# Patient Record
Sex: FEMALE | Race: WHITE | NOT HISPANIC OR LATINO | URBAN - METROPOLITAN AREA
[De-identification: names, ages, dates, MRNs, and addresses within clinical notes are randomized per-mention and may not be internally consistent; named-entity substitution may affect disease eponyms.]

---

## 2023-09-08 ENCOUNTER — OFFICE VISIT (OUTPATIENT)
Dept: FAMILY MEDICINE CLINIC | Facility: CLINIC | Age: 28
End: 2023-09-08
Payer: COMMERCIAL

## 2023-09-08 VITALS
SYSTOLIC BLOOD PRESSURE: 116 MMHG | HEART RATE: 80 BPM | DIASTOLIC BLOOD PRESSURE: 68 MMHG | BODY MASS INDEX: 21.55 KG/M2 | WEIGHT: 121.6 LBS | HEIGHT: 63 IN | TEMPERATURE: 98.2 F | OXYGEN SATURATION: 99 %

## 2023-09-08 DIAGNOSIS — Z00.00 ANNUAL PHYSICAL EXAM: Primary | ICD-10-CM

## 2023-09-08 DIAGNOSIS — F41.9 ANXIETY: ICD-10-CM

## 2023-09-08 DIAGNOSIS — Z76.89 ENCOUNTER TO ESTABLISH CARE: ICD-10-CM

## 2023-09-08 PROBLEM — R87.810 ASCUS WITH POSITIVE HIGH RISK HPV CERVICAL: Status: ACTIVE | Noted: 2022-09-21

## 2023-09-08 PROBLEM — R87.610 ASCUS WITH POSITIVE HIGH RISK HPV CERVICAL: Status: ACTIVE | Noted: 2022-09-21

## 2023-09-08 PROBLEM — K21.9 GASTROESOPHAGEAL REFLUX DISEASE WITHOUT ESOPHAGITIS: Status: ACTIVE | Noted: 2023-09-08

## 2023-09-08 PROBLEM — Z82.49 FH: CAD (CORONARY ARTERY DISEASE): Status: ACTIVE | Noted: 2023-05-18

## 2023-09-08 PROCEDURE — 99385 PREV VISIT NEW AGE 18-39: CPT | Performed by: FAMILY MEDICINE

## 2023-09-08 RX ORDER — AZITHROMYCIN 250 MG/1
TABLET, FILM COATED ORAL
COMMUNITY
Start: 2023-09-07

## 2023-09-08 RX ORDER — NORGESTIMATE AND ETHINYL ESTRADIOL 7DAYSX3 28
1 KIT ORAL DAILY
COMMUNITY
Start: 2023-08-17

## 2023-09-08 RX ORDER — OMEPRAZOLE 20 MG/1
20 CAPSULE, DELAYED RELEASE ORAL AS NEEDED
COMMUNITY
Start: 2023-08-08

## 2023-09-08 RX ORDER — DEXTROMETHORPHAN HYDROBROMIDE AND PROMETHAZINE HYDROCHLORIDE 15; 6.25 MG/5ML; MG/5ML
5 SYRUP ORAL DAILY PRN
COMMUNITY
Start: 2023-09-07

## 2023-09-08 NOTE — LETTER
September 8, 2023     Patient: Sarah Clancy  YOB: 1995  Date of Visit: 9/8/2023      To Whom it May Concern:    Sarah Clancy is under my professional care. Tc Yip has a diagnosis of anxiety and needs to live with her emotional support dog, Daquan Jolly who is a white labrador retriever. This will provide therapeutic emotional support which will alleviate her anxiety. If you have questions, please do not hesitate to contact me.              Sincerely,          Jada Bang MD  License Number: [id number]  NPI: [id number]

## 2023-09-08 NOTE — PROGRESS NOTES
[address] PRIMARY CARE    NAME: Sarah Clancy  AGE: 33 y.o. SEX: female  : 1995     DATE: 2023     Assessment and Plan:     Problem List Items Addressed This Visit        Other    Anxiety   Other Visit Diagnoses     Annual physical exam    -  Primary    Encounter to establish care            - Satisfactory physical examination  - HIV and Hep C screening declined at this time  - Per chart patient did have an abnormal pap smear in  followed by a colposcopy which was negative. She did have a repeat pap smear ; results are still pending  - Emotional support letter provided; see chart for copy  - Follow up in one year for annual physical or as needed    Immunizations and preventive care screenings were discussed with patient today. Appropriate education was printed on patient's after visit summary. Return in about 1 year (around 2024) for Annual physical.     Chief Complaint:     Chief Complaint   Patient presents with   • Establish Care     Needs letter for emotional support animal      History of Present Illness:     Adult Annual Physical      Sarah Clancy is a very pleasant 33year old female with a past medical history of anxiety and GERD who presents today for an encounter to establish care and physical. Apart from a mild episode of bronchitis which is currently being treated with a course of Azithromycin she feels well. She does have anxiety which she manages without medication. She does have an emotional support dog which helps immensely and is requesting a letter an emotional support letter for her landlord. She endorses no other complaints at this time. 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 She is in a monogamous relationship with her boyfriend who is currently in the [employer] and stationed in [place]. He will be coming home for good in 2 weeks.  She does not smoke tobacco cigarettes or use illicit drugs. She rarely drinks alcohol. Diet and Physical Activity  · Diet/Nutrition: well balanced diet. · Exercise: moderate cardiovascular exercise. Depression Screening  PHQ-2/9 Depression Screening    Little interest or pleasure in doing things: 0 - not at all  Feeling down, depressed, or hopeless: 0 - not at all  PHQ-2 Score: 0  PHQ-2 Interpretation: Negative depression screen       General Health  · Sleep: sleeps well. · Hearing: Does not require use of hearing aids. · Vision: goes for regular eye exams and wears glasses. · Dental: regular dental visits. /GYN Health  · Contraceptive method: oral contraceptives. · History of STDs?: no.     Review of Systems:     Review of Systems   Constitutional: Negative. HENT: Negative. Eyes: Negative. Respiratory: Negative. Cardiovascular: Negative. Gastrointestinal: Negative. Genitourinary: Negative. Musculoskeletal: Negative. Skin: Negative. Neurological: Negative. Psychiatric/Behavioral: Negative.        Past Medical History:     Past Medical History:   Diagnosis Date   • GERD (gastroesophageal reflux disease)    • Urinary tract infection       Past Surgical History:     Past Surgical History:   Procedure Laterality Date   • TONSILLECTOMY AND ADENOIDECTOMY Bilateral       Social History:     Social History     Socioeconomic History   • Marital status: Single     Spouse name: None   • Number of children: None   • Years of education: None   • Highest education level: None   Occupational History   • None   Tobacco Use   • Smoking status: Never   • Smokeless tobacco: Never   Vaping Use   • Vaping Use: Never used   Substance and Sexual Activity   • Alcohol use: Not Currently     Alcohol/week: 1.0 standard drink of alcohol     Types: 1 Glasses of wine per week     Comment: 1 glass every 6 months or more I am not a drinker   • Drug use: Never   • Sexual activity: Yes     Partners: Male     Comment: Birth control pill   Other Topics Concern   • None   Social History Narrative   • None     Social Determinants of Health     Financial Resource Strain: Not on file   Food Insecurity: Not on file   Transportation Needs: Not on file   Physical Activity: Not on file   Stress: Not on file   Social Connections: Not on file   Intimate Partner Violence: Not on file   Housing Stability: Not on file      Family History:     Family History   Problem Relation Age of Onset   • Heart disease Father    • Heart disease Maternal Grandfather    • Prostate cancer Maternal Grandfather    • Stroke Maternal Grandmother    • Heart disease Maternal Grandmother    • Diabetes Maternal Grandmother    • Breast cancer Maternal Grandmother    • Cancer Maternal Grandmother    • Mental illness Maternal Aunt    • Heart disease Maternal Aunt    • Bipolar disorder Maternal Aunt    • Heart disease Maternal Uncle       Current Medications:     Current Outpatient Medications   Medication Sig Dispense Refill   • azithromycin (ZITHROMAX) 250 mg tablet Take 2 tablets by mouth on day one; then one tablet daily for 4 days. • omeprazole (PriLOSEC) 20 mg delayed release capsule Take 20 mg by mouth if needed     • promethazine-dextromethorphan (PHENERGAN-DM) 6.25-15 mg/5 mL oral syrup Take 5 mL by mouth daily as needed     • Tri-Sprintec 0.18/0.215/0.25 MG-35 MCG per tablet Take 1 tablet by mouth daily       No current facility-administered medications for this visit. Allergies: Allergies   Allergen Reactions   • Nsaids Anaphylaxis and Hives   • Bismuth Subsalicylate Hives     Pt states "I can take OTC meds for my stomach, but no prescriptions." Pt reports her reaction is hives, "my throat closes off."   • Ibuprofen Hives and GI Bleeding     Pt reports it makes her stomach bleed.       Physical Exam:     /68 (BP Location: Left arm, Patient Position: Sitting, Cuff Size: Adult)   Pulse 80   Temp 98.2 °F (36.8 °C) (Temporal)   Ht 5' 3.18" (1.605 m)   Wt 55.2 kg (121 lb 9.6 oz)   SpO2 99%   BMI 21.42 kg/m²     Physical Exam  Constitutional:       General: She is not in acute distress. Appearance: She is not ill-appearing. HENT:      Head: Normocephalic and atraumatic. Mouth/Throat:      Mouth: Mucous membranes are moist.   Eyes:      General:         Right eye: No discharge. Left eye: No discharge. Extraocular Movements: Extraocular movements intact. Pupils: Pupils are equal, round, and reactive to light. Cardiovascular:      Rate and Rhythm: Normal rate. Pulmonary:      Effort: Pulmonary effort is normal. No respiratory distress. Breath sounds: No wheezing. Abdominal:      General: Bowel sounds are normal. There is no distension. Palpations: Abdomen is soft. Tenderness: There is no abdominal tenderness. Musculoskeletal:      Right lower leg: No edema. Left lower leg: No edema. Neurological:      General: No focal deficit present. Mental Status: She is alert. Motor: No weakness. Coordination: Coordination normal.      Gait: Gait normal.      Deep Tendon Reflexes: Reflexes normal.   Psychiatric:         Mood and Affect: Mood normal.         Behavior: Behavior normal.        BMI Counseling: Body mass index is 21.42 kg/m².  The BMI is normal.    MD Mariano Urias

## 2024-01-07 ENCOUNTER — APPOINTMENT (EMERGENCY)
Dept: ULTRASOUND IMAGING | Facility: HOSPITAL | Age: 29
End: 2024-01-07
Payer: COMMERCIAL

## 2024-01-07 ENCOUNTER — APPOINTMENT (EMERGENCY)
Dept: CT IMAGING | Facility: HOSPITAL | Age: 29
End: 2024-01-07
Payer: COMMERCIAL

## 2024-01-07 ENCOUNTER — HOSPITAL ENCOUNTER (EMERGENCY)
Facility: HOSPITAL | Age: 29
Discharge: HOME | End: 2024-01-07
Attending: EMERGENCY MEDICINE | Admitting: EMERGENCY MEDICINE
Payer: COMMERCIAL

## 2024-01-07 VITALS
DIASTOLIC BLOOD PRESSURE: 57 MMHG | SYSTOLIC BLOOD PRESSURE: 115 MMHG | WEIGHT: 124.8 LBS | TEMPERATURE: 97.9 F | HEART RATE: 87 BPM | OXYGEN SATURATION: 98 % | RESPIRATION RATE: 18 BRPM | BODY MASS INDEX: 21.98 KG/M2

## 2024-01-07 DIAGNOSIS — N83.209 OVARIAN CYST: Primary | ICD-10-CM

## 2024-01-07 DIAGNOSIS — D48.19 LEIOMYOMATOSIS: ICD-10-CM

## 2024-01-07 LAB
ALBUMIN SERPL BCP-MCNC: 4.4 G/DL (ref 3.5–5)
ALP SERPL-CCNC: 45 U/L (ref 34–104)
ALT SERPL W P-5'-P-CCNC: 14 U/L (ref 7–52)
ANION GAP SERPL CALCULATED.3IONS-SCNC: 15 MMOL/L
AST SERPL W P-5'-P-CCNC: 14 U/L (ref 13–39)
BACTERIA UR QL AUTO: ABNORMAL /HPF
BASOPHILS # BLD AUTO: 0.05 THOUSANDS/ÂΜL (ref 0–0.1)
BASOPHILS NFR BLD AUTO: 1 % (ref 0–1)
BILIRUB SERPL-MCNC: 0.3 MG/DL (ref 0.2–1)
BILIRUB UR QL STRIP: NEGATIVE
BUN SERPL-MCNC: 12 MG/DL (ref 5–25)
CALCIUM SERPL-MCNC: 9 MG/DL (ref 8.4–10.2)
CHLORIDE SERPL-SCNC: 101 MMOL/L (ref 96–108)
CLARITY UR: CLEAR
CO2 SERPL-SCNC: 24 MMOL/L (ref 21–32)
COLOR UR: ABNORMAL
CREAT SERPL-MCNC: 0.63 MG/DL (ref 0.6–1.3)
EOSINOPHIL # BLD AUTO: 0.11 THOUSAND/ÂΜL (ref 0–0.61)
EOSINOPHIL NFR BLD AUTO: 1 % (ref 0–6)
ERYTHROCYTE [DISTWIDTH] IN BLOOD BY AUTOMATED COUNT: 12.6 % (ref 11.6–15.1)
EXT PREGNANCY TEST URINE: NEGATIVE
EXT. CONTROL: NORMAL
GFR SERPL CREATININE-BSD FRML MDRD: 121 ML/MIN/1.73SQ M
GLUCOSE SERPL-MCNC: 85 MG/DL (ref 65–140)
GLUCOSE UR STRIP-MCNC: NEGATIVE MG/DL
HCT VFR BLD AUTO: 41.7 % (ref 34.8–46.1)
HGB BLD-MCNC: 13.8 G/DL (ref 11.5–15.4)
HGB UR QL STRIP.AUTO: ABNORMAL
IMM GRANULOCYTES # BLD AUTO: 0.05 THOUSAND/UL (ref 0–0.2)
IMM GRANULOCYTES NFR BLD AUTO: 1 % (ref 0–2)
KETONES UR STRIP-MCNC: ABNORMAL MG/DL
LEUKOCYTE ESTERASE UR QL STRIP: NEGATIVE
LIPASE SERPL-CCNC: 32 U/L (ref 11–82)
LYMPHOCYTES # BLD AUTO: 2.46 THOUSANDS/ÂΜL (ref 0.6–4.47)
LYMPHOCYTES NFR BLD AUTO: 24 % (ref 14–44)
MCH RBC QN AUTO: 28.9 PG (ref 26.8–34.3)
MCHC RBC AUTO-ENTMCNC: 33.1 G/DL (ref 31.4–37.4)
MCV RBC AUTO: 87 FL (ref 82–98)
MONOCYTES # BLD AUTO: 0.55 THOUSAND/ÂΜL (ref 0.17–1.22)
MONOCYTES NFR BLD AUTO: 6 % (ref 4–12)
MUCOUS THREADS UR QL AUTO: ABNORMAL
NEUTROPHILS # BLD AUTO: 6.85 THOUSANDS/ÂΜL (ref 1.85–7.62)
NEUTS SEG NFR BLD AUTO: 67 % (ref 43–75)
NITRITE UR QL STRIP: NEGATIVE
NON-SQ EPI CELLS URNS QL MICRO: ABNORMAL /HPF
NRBC BLD AUTO-RTO: 0 /100 WBCS
PH UR STRIP.AUTO: 5.5 [PH]
PLATELET # BLD AUTO: 272 THOUSANDS/UL (ref 149–390)
PMV BLD AUTO: 11.1 FL (ref 8.9–12.7)
POTASSIUM SERPL-SCNC: 3.5 MMOL/L (ref 3.5–5.3)
PROT SERPL-MCNC: 7.4 G/DL (ref 6.4–8.4)
PROT UR STRIP-MCNC: NEGATIVE MG/DL
RBC # BLD AUTO: 4.78 MILLION/UL (ref 3.81–5.12)
RBC #/AREA URNS AUTO: ABNORMAL /HPF
SODIUM SERPL-SCNC: 140 MMOL/L (ref 135–147)
SP GR UR STRIP.AUTO: 1.02 (ref 1–1.03)
UROBILINOGEN UR STRIP-ACNC: <2 MG/DL
WBC # BLD AUTO: 10.07 THOUSAND/UL (ref 4.31–10.16)
WBC #/AREA URNS AUTO: ABNORMAL /HPF

## 2024-01-07 PROCEDURE — G1004 CDSM NDSC: HCPCS

## 2024-01-07 PROCEDURE — 99284 EMERGENCY DEPT VISIT MOD MDM: CPT | Performed by: EMERGENCY MEDICINE

## 2024-01-07 PROCEDURE — 76856 US EXAM PELVIC COMPLETE: CPT

## 2024-01-07 PROCEDURE — 99244 OFF/OP CNSLTJ NEW/EST MOD 40: CPT | Performed by: OBSTETRICS & GYNECOLOGY

## 2024-01-07 PROCEDURE — 83690 ASSAY OF LIPASE: CPT

## 2024-01-07 PROCEDURE — 85025 COMPLETE CBC W/AUTO DIFF WBC: CPT

## 2024-01-07 PROCEDURE — 80053 COMPREHEN METABOLIC PANEL: CPT

## 2024-01-07 PROCEDURE — 96374 THER/PROPH/DIAG INJ IV PUSH: CPT

## 2024-01-07 PROCEDURE — 81001 URINALYSIS AUTO W/SCOPE: CPT

## 2024-01-07 PROCEDURE — 36415 COLL VENOUS BLD VENIPUNCTURE: CPT

## 2024-01-07 PROCEDURE — 76830 TRANSVAGINAL US NON-OB: CPT

## 2024-01-07 PROCEDURE — 81025 URINE PREGNANCY TEST: CPT

## 2024-01-07 PROCEDURE — 99284 EMERGENCY DEPT VISIT MOD MDM: CPT

## 2024-01-07 PROCEDURE — 74177 CT ABD & PELVIS W/CONTRAST: CPT

## 2024-01-07 RX ORDER — ONDANSETRON 2 MG/ML
4 INJECTION INTRAMUSCULAR; INTRAVENOUS ONCE
Status: COMPLETED | OUTPATIENT
Start: 2024-01-07 | End: 2024-01-07

## 2024-01-07 RX ORDER — ACETAMINOPHEN 325 MG/1
975 TABLET ORAL ONCE
Status: COMPLETED | OUTPATIENT
Start: 2024-01-07 | End: 2024-01-07

## 2024-01-07 RX ADMIN — IOHEXOL 75 ML: 350 INJECTION, SOLUTION INTRAVENOUS at 17:16

## 2024-01-07 RX ADMIN — ACETAMINOPHEN 975 MG: 325 TABLET, FILM COATED ORAL at 16:24

## 2024-01-07 RX ADMIN — ONDANSETRON 4 MG: 2 INJECTION INTRAMUSCULAR; INTRAVENOUS at 16:24

## 2024-01-07 NOTE — Clinical Note
Date: 1/7/2024  Patient: Zuleima Mcdonough  Admitted: 1/7/2024  3:49 PM  Attending Provider: Panda Green MD    Zuleima Mcdonough or her authorized caregiver has made the decision for the patient to leave the emergency department against the advice o f her attending physician. She or her authorized caregiver has been informed and understands the inherent risks, including death, ovarian compromise, infection, prolonged hospital stay, emergency surgery.  She or her authorized caregiver has decided  to accept the responsibility for this decision. Zuleima Mcdonough and all necessary parties have been advised that she may return for further evaluation or treatment. Her condition at time of discharge was stable.  Zuleima Mcdonough had current vital signs  as follows:  /89   Pulse (!) 113   Temp 97.9 °F (36.6 °C) (Oral)   Resp 18   Wt 56.6 kg (124 lb 12.8 oz)   LMP 12/17/2023 (Approximate)

## 2024-01-07 NOTE — ED ATTENDING ATTESTATION
1/7/2024  I, Panda Green MD, saw and evaluated the patient. I have discussed the patient with the resident/non-physician practitioner and agree with the resident's/non-physician practitioner's findings, Plan of Care, and MDM as documented in the resident's/non-physician practitioner's note, except where noted. All available labs and Radiology studies were reviewed.  I was present for key portions of any procedure(s) performed by the resident/non-physician practitioner and I was immediately available to provide assistance.       At this point I agree with the current assessment done in the Emergency Department.  I have conducted an independent evaluation of this patient a history and physical is as follows:    27 y/o otherwise healthy female sent from urgent care for evaluation of RLQ pain beginning yesterday and worsening today. Associated loss of appetite and nausea without vomiting. LMP 3 weeks ago. No urinary symptoms. No fever, chills. No history of abdominal surgeries. Appears uncomfortable but not in acute distress. Has focal RLQ tenderness with guarding. Plan labs, CT, symptomatic treatment. Suspect acute appendicitis, less likely ovarian pathology, ureteral stone, viral illness.    ED Course  ED Course as of 01/07/24 2238   Sun Jan 07, 2024 1927 CT shows:    1.  4.8 cm right ovarian cyst without associated inflammation. This may be the cause of patient's symptoms, and assessment for clinical signs of ovarian torsion is advised.  2.  No appendicitis.     1927 Pelvic ultrasound ordered but patient refused due to potential cost.  Discussed the possibility of ovarian torsion, infection, permanent sterilization.  Will discuss with OB/GYN team as patient is still having pain.   2047 After evaluation by OB/GYN team, the patient is agreeable to pelvic ultrasound for ovarian torsion rule out.   2237 US shows:    Leiomyomatous uterus     No evidence of right ovarian torsion. Left ovary not visualized and  therefore not evaluated.     Right ovarian septated cyst measuring up to 4.8 cm.  O-RADS 2 = Almost certainly benign  Follow up in 8-12 weeks.     7904 Patient reevaluated by OB/GYN team.  Deemed stable for discharge as her pain has improved.  Encouraged to return to the emergency department with any worsening symptoms for urgent evaluation.         Critical Care Time  Procedures

## 2024-01-07 NOTE — ED PROVIDER NOTES
History  Chief Complaint   Patient presents with    Abdominal Pain     Pt comes to ED c/o RLQ pain starting last night. +nausea     28-year-old female no significant medical history presents today with 1 day of right lower quadrant pain.  Patient states it started as periumbilical and right lower quadrant pain and now has persisted as only RLQ pain.  Patient states significant associated nausea, no vomiting yet.  Exacerbated by movement.  Has tried Tylenol which is only limited improvement.  No history of of abdominal surgeries, did have a bowel movement this morning and has been passing gas today, no family history of IBD, denies urinary symptoms including dysuria, urgency, frequency.  No other symptoms noted including chest pain/shortness of breath, diarrhea/constipation, numbness tingling.        Prior to Admission Medications   Prescriptions Last Dose Informant Patient Reported? Taking?   Tri-Sprintec 0.18/0.215/0.25 MG-35 MCG per tablet  Self Yes No   Sig: Take 1 tablet by mouth daily   azithromycin (ZITHROMAX) 250 mg tablet  Self Yes No   Sig: Take 2 tablets by mouth on day one; then one tablet daily for 4 days.   omeprazole (PriLOSEC) 20 mg delayed release capsule  Self Yes No   Sig: Take 20 mg by mouth if needed   promethazine-dextromethorphan (PHENERGAN-DM) 6.25-15 mg/5 mL oral syrup  Self Yes No   Sig: Take 5 mL by mouth daily as needed      Facility-Administered Medications: None       Past Medical History:   Diagnosis Date    GERD (gastroesophageal reflux disease)     Urinary tract infection        Past Surgical History:   Procedure Laterality Date    TONSILLECTOMY AND ADENOIDECTOMY Bilateral        Family History   Problem Relation Age of Onset    Heart disease Father     Heart disease Maternal Grandfather     Prostate cancer Maternal Grandfather     Stroke Maternal Grandmother     Heart disease Maternal Grandmother     Diabetes Maternal Grandmother     Breast cancer Maternal Grandmother     Cancer  Maternal Grandmother     Mental illness Maternal Aunt     Heart disease Maternal Aunt     Bipolar disorder Maternal Aunt     Heart disease Maternal Uncle      I have reviewed and agree with the history as documented.    E-Cigarette/Vaping    E-Cigarette Use Never User      E-Cigarette/Vaping Substances     Social History     Tobacco Use    Smoking status: Never    Smokeless tobacco: Never   Vaping Use    Vaping status: Never Used   Substance Use Topics    Alcohol use: Not Currently     Alcohol/week: 1.0 standard drink of alcohol     Types: 1 Glasses of wine per week     Comment: 1 glass every 6 months or more I am not a drinker    Drug use: Never        Review of Systems   Constitutional:  Negative for chills and fever.   HENT:  Negative for congestion, rhinorrhea and sneezing.    Eyes:  Negative for pain and visual disturbance.   Respiratory:  Negative for cough and shortness of breath.    Cardiovascular:  Negative for chest pain and palpitations.   Gastrointestinal:  Positive for abdominal pain and nausea. Negative for constipation, diarrhea and vomiting.   Genitourinary:  Negative for dysuria and hematuria.   Musculoskeletal:  Negative for arthralgias and back pain.   Skin:  Negative for color change and rash.   Neurological:  Negative for syncope, weakness, numbness and headaches.   All other systems reviewed and are negative.      Physical Exam  ED Triage Vitals   Temperature Pulse Respirations Blood Pressure SpO2   01/07/24 1554 01/07/24 1551 01/07/24 1551 01/07/24 1551 01/07/24 1551   97.9 °F (36.6 °C) (!) 113 18 150/89 100 %      Temp Source Heart Rate Source Patient Position - Orthostatic VS BP Location FiO2 (%)   01/07/24 1554 01/07/24 1551 01/07/24 2210 01/07/24 2210 --   Oral Monitor Lying Right arm       Pain Score       01/07/24 1551       6             Orthostatic Vital Signs  Vitals:    01/07/24 1551 01/07/24 2210   BP: 150/89 115/57   Pulse: (!) 113 87   Patient Position - Orthostatic VS:  Lying        Physical Exam  Vitals and nursing note reviewed.   Constitutional:       General: She is not in acute distress.     Appearance: She is not ill-appearing.   HENT:      Head: Normocephalic and atraumatic.      Right Ear: External ear normal.      Left Ear: External ear normal.      Nose: Nose normal. No congestion or rhinorrhea.      Mouth/Throat:      Mouth: Mucous membranes are moist.      Pharynx: Oropharynx is clear.   Eyes:      General: No scleral icterus.     Extraocular Movements: Extraocular movements intact.   Cardiovascular:      Rate and Rhythm: Regular rhythm. Tachycardia present.      Pulses: Normal pulses.      Heart sounds: Normal heart sounds.   Pulmonary:      Effort: Pulmonary effort is normal.      Breath sounds: Normal breath sounds.   Abdominal:      Palpations: Abdomen is soft.      Tenderness: There is abdominal tenderness in the right lower quadrant. There is guarding. Positive signs include McBurney's sign. Negative signs include Lemus's sign.   Musculoskeletal:         General: Normal range of motion.      Cervical back: Normal range of motion.   Skin:     General: Skin is warm and dry.   Neurological:      General: No focal deficit present.      Mental Status: She is alert and oriented to person, place, and time.   Psychiatric:         Mood and Affect: Mood normal.         Behavior: Behavior normal.         ED Medications  Medications   acetaminophen (TYLENOL) tablet 975 mg (975 mg Oral Given 1/7/24 1624)   ondansetron (ZOFRAN) injection 4 mg (4 mg Intravenous Given 1/7/24 1624)   iohexol (OMNIPAQUE) 350 MG/ML injection (MULTI-DOSE) 75 mL (75 mL Intravenous Given 1/7/24 1716)       Diagnostic Studies  Results Reviewed       Procedure Component Value Units Date/Time    Urine Microscopic [[id number]]  (Abnormal) Collected: 01/07/24 1633    Lab Status: Final result Specimen: Urine, Clean Catch Updated: 01/07/24 1731     RBC, UA 2-4 /hpf      WBC, UA 1-2 /hpf      Epithelial Cells  Occasional /hpf      Bacteria, UA Moderate /hpf      MUCUS THREADS Moderate    Comprehensive metabolic panel [[id number]] Collected: 01/07/24 1628    Lab Status: Final result Specimen: Blood from Arm, Left Updated: 01/07/24 1706     Sodium 140 mmol/L      Potassium 3.5 mmol/L      Chloride 101 mmol/L      CO2 24 mmol/L      ANION GAP 15 mmol/L      BUN 12 mg/dL      Creatinine 0.63 mg/dL      Glucose 85 mg/dL      Calcium 9.0 mg/dL      AST 14 U/L      ALT 14 U/L      Alkaline Phosphatase 45 U/L      Total Protein 7.4 g/dL      Albumin 4.4 g/dL      Total Bilirubin 0.30 mg/dL      eGFR 121 ml/min/1.73sq m     Narrative:      National Kid[employer] Foundation guidelines for Chronic Kidney Disease (CKD):     Stage 1 with normal or high GFR (GFR > 90 mL/min/1.73 square meters)    Stage 2 Mild CKD (GFR = 60-89 mL/min/1.73 square meters)    Stage 3A Moderate CKD (GFR = 45-59 mL/min/1.73 square meters)    Stage 3B Moderate CKD (GFR = 30-44 mL/min/1.73 square meters)    Stage 4 Severe CKD (GFR = 15-29 mL/min/1.73 square meters)    Stage 5 End Stage CKD (GFR <15 mL/min/1.73 square meters)  Note: GFR calculation is accurate only with a steady state creatinine    Lipase [[id number]]  (Normal) Collected: 01/07/24 1628    Lab Status: Final result Specimen: Blood from Arm, Left Updated: 01/07/24 1706     Lipase 32 u/L     UA w Reflex to Microscopic w Reflex to Culture [[id number]]  (Abnormal) Collected: 01/07/24 1633    Lab Status: Final result Specimen: Urine, Clean Catch Updated: 01/07/24 1654     Color, UA Light Yellow     Clarity, UA Clear     Specific Gravity, UA 1.024     pH, UA 5.5     Leukocytes, UA Negative     Nitrite, UA Negative     Protein, UA Negative mg/dl      Glucose, UA Negative mg/dl      Ketones, UA 20 (1+) mg/dl      Urobilinogen, UA <2.0 mg/dl      Bilirubin, UA Negative     Occult Blood, UA Trace    CBC and differential [[id number]] Collected: 01/07/24 1628    Lab Status: Final result Specimen: Blood from  Arm, Left Updated: 01/07/24 1645     WBC 10.07 Thousand/uL      RBC 4.78 Million/uL      Hemoglobin 13.8 g/dL      Hematocrit 41.7 %      MCV 87 fL      MCH 28.9 pg      MCHC 33.1 g/dL      RDW 12.6 %      MPV 11.1 fL      Platelets 272 Thousands/uL      nRBC 0 /100 WBCs      Neutrophils Relative 67 %      Immat GRANS % 1 %      Lymphocytes Relative 24 %      Monocytes Relative 6 %      Eosinophils Relative 1 %      Basophils Relative 1 %      Neutrophils Absolute 6.85 Thousands/µL      Immature Grans Absolute 0.05 Thousand/uL      Lymphocytes Absolute 2.46 Thousands/µL      Monocytes Absolute 0.55 Thousand/µL      Eosinophils Absolute 0.11 Thousand/µL      Basophils Absolute 0.05 Thousands/µL     POCT pregnancy, urine [[id number]]  (Normal) Resulted: 01/07/24 1637    Lab Status: Final result Specimen: Urine Updated: 01/07/24 1637     EXT Preg Test, Ur Negative     Control Valid                   US pelvis complete w transvaginal   Final Result by Farrukh Mann DO (01/07 2210)      Leiomyomatous uterus      No evidence of right ovarian torsion. Left ovary not visualized and therefore not evaluated.      Right ovarian septated cyst measuring up to 4.8 cm.  O-RADS 2 = Almost certainly benign  Follow up in 8-12 weeks.      The study was marked in EPIC for immediate notification.                  Workstation performed: [id number]         CT abdomen pelvis with contrast   Final Result by Rolando Mejia MD (01/07 1837)      1.  4.8 cm right ovarian cyst without associated inflammation. This may be the cause of patient's symptoms, and assessment for clinical signs of ovarian torsion is advised.   2.  No appendicitis.      The study was marked in EPIC for immediate notification.      Workstation performed: [id number]               Procedures  Procedures      ED Course  ED Course as of 01/07/24 2239   Sun Jan 07, 2024   1720 CBC, CMP, UA were all within normal limits with no signs of endorgan damage or infection.   1720  PREGNANCY TEST URINE: Negative   1946 Discussed with patient that we would like to do a pelvic ultrasound due to ovarian cyst found on CT and no signs of appendicitis to rule out ovarian torsion.  Patient refused at this time due to cost and time constraints.  Discussed with OB/GYN team who reiterated the importance of ultrasound to determine if she needs surgical intervention.  Discussed with patient again and she says she needs time to think about it and does need to be at work tomorrow so time is becoming an issue.   2039 OB/GYN evaluated the patient and are highly suspicious for ovarian torsion.  They were able to convince patient to stay and get the ultrasound.  Ultrasound ordered.                                       Medical Decision Making  Zuleima came to the emergency right lower quadrant abdominal pain.  Patient states it started periumbilical and then migrated down.  Also associate with nausea, no other symptoms.  Patient was significantly tender in the right lower quadrant with a positive McBurney's point on exam.  CT abdomen showed no appendicitis however did note a large ovarian cyst which could be leading to ovarian torsion given the patient the symptoms.  OB/GYN was consulted, and evaluated the patient and were highly suspicious for ovarian torsion as well.  Patient was ultimately ordered an ultrasound of the pelvis, which showed no signs of ovarian torsion.  It did redemonstrate the ovarian cyst and also noted leiomyomatosis in the uterus.  OB/GYN reevaluated the patient as well after ultrasound, and was comfortable discharging with very close OB/GYN follow-up and extremely strict return cautions.  Patient was informed of these findings, and instructed to follow-up closely with her OB/GYN for further evaluation and treatment.  She is also given very strict return precautions if the symptoms are not resolving or worsening.  Patient understood and agreed to plan.  Patient discharge stable  condition.    Amount and/or Complexity of Data Reviewed  Labs: ordered. Decision-making details documented in ED Course.  Radiology: ordered.    Risk  OTC drugs.  Prescription drug management.          Disposition  Final diagnoses:   Ovarian cyst   Leiomyomatosis     Time reflects when diagnosis was documented in both MDM as applicable and the Disposition within this note       Time User Action Codes Description Comment    1/7/2024  8:14 PM Erick Briceno Add [N83.209] Ovarian cyst     1/7/2024  8:15 PM Erick Briceno Add [R10.31] Right lower quadrant abdominal pain     1/7/2024 10:26 PM Erick Briceno Add [D48.19] Leiomyomatosis     1/7/2024 10:27 PM Erick Briceno Remove [R10.31] Right lower quadrant abdominal pain           ED Disposition       ED Disposition   Discharge    Condition   Stable    Date/Time   Sun Jan 7, 2024 10:26 PM    Comment   Zuleima Mcdonough discharge to home/self care.                   Follow-up Information       Follow up With Specialties Details Why Contact Info Additional Information    Brenda Pelletier MD Family Medicine Call  As needed, For ED follow-up [address] [place] [address]  [phone]       [facility] Emergency Department Emergency Medicine Go to  If symptoms worsen or do not resolve [facility] [address]  [phone] [facility] Emergency Department, [facility] [place], [place], [address]    [facility] Obstetrics and Gynecology Call  As needed [address] [address] [phone] [facility], [address], [address]    [phone]            Patient's Medications   Discharge Prescriptions    No medications on file     No discharge procedures on file.    PDMP Review       None             ED Provider  Attending physically available and evaluated Zuleima Mcdonough. I  managed the patient along with the ED Attending.    Electronically Signed by           Erick Briceno,   01/07/24 7853

## 2024-01-08 PROBLEM — R10.31 ABDOMINAL PAIN, RLQ (RIGHT LOWER QUADRANT): Status: ACTIVE | Noted: 2024-01-08

## 2024-01-08 NOTE — ASSESSMENT & PLAN NOTE
Significantly tender RLQ on abdominal exam with associated rebound and guarding  CT without evidence of appendicitis but newly identified 4.8 x 3.9 x 4.1 cm right ovarian cyst with septation  Transvaginal ultrasound without evidence of ovarian torsion, left ovary not visualized  Reviewed risks of ovarian torsion and waxing and waning presentation similar to patient's current presentation  Patient does not desire surgical management at this time  Reviewed strict return precautions for intractable abdominal pain, nausea, and vomiting with risk of ovarian torsion  Stable for discharge

## 2024-01-08 NOTE — CONSULTS
CONSULTATION - OB/GYN  Zuleima Mcdonough 28 y.o. female MRN: [id number]  Unit/Bed#: ED-38 Encounter: [id number]        ASSESSMENT/PLAN:  27yo G0 presenting with worsening RLQ abdominal pain and newly identified right ovarian cyst. Plan by problem:    * Abdominal pain, RLQ (right lower quadrant)  Assessment & Plan  Significantly tender RLQ on abdominal exam with associated rebound and guarding  CT without evidence of appendicitis but newly identified 4.8 x 3.9 x 4.1 cm right ovarian cyst with septation  Transvaginal ultrasound without evidence of ovarian torsion, left ovary not visualized  Reviewed risks of ovarian torsion and waxing and waning presentation similar to patient's current presentation  Patient does not desire surgical management at this time  Reviewed strict return precautions for intractable abdominal pain, nausea, and vomiting with risk of ovarian torsion  Stable for discharge          US pelvis complete w transvaginal 1/7/24  FINDINGS:   UTERUS:  The uterus is anteverted in position, measuring 8.5 x 4.5 x 6.4 cm.  4.0 x 3.4 x 3.5 cm intramural fibroid at the fundus. 1.3 x 1.0 x 1.5 cm subserosal fibroid posterior body  The cervix appears within normal limits.   ENDOMETRIUM:  The endometrial echo complex has an AP caliber of 6.0 mm.  Its appearance is within normal limits for age and cycle and shows no filling defects.   OVARIES/ADNEXA:  Right ovary:  5.1 x 4.9 x 5.1 cm. 68.0 mL.  Left ovary not visualized and therefore not evaluated. This may be due to overlying bowel gas.  Ovarian Doppler flow is within normal limits.  2 separate cysts or a single cyst with a smooth septation measuring up to 4.8 cm in conglomerate.   OTHER:  Small amount of simple free fluid in the pelvis, likely physiologic in this premenopausal female.      IMPRESSION:   Leiomyomatous uterus   No evidence of right ovarian torsion. Left ovary not visualized and therefore not evaluated.   Right ovarian septated cyst measuring up to  4.8 cm.  O-RADS 2 = Almost certainly benign  Follow up in 8-12 weeks.      SUBJECTIVE:    HPI: Zuleima Mcdonough is a 28 y.o. female who presents with worsening RLQ pain. LMP approximately 3 weeks ago. Urine pregnancy test negative on presentation. Zuleima reports worsening RLQ pain throughout the day that has steadily worsened with associated nausea. She reports the pain is sharp in nature like she is being pricked in the side but spontaneously resolves with rest. Ambulation and moving from a laying position make the pain worse. She has tried Tylenol with noted improvement in her pain. We discussed her newly diagnosed right ovarian cyst and risk of ovarian torsion. We reviewed the waxing and waning nature of ovarian torsion and although current ultrasound imaging does not revolve an actively torsed ovary with compromised vascular flow, there is a chance for torsion or re-torsion should her resolved pain be due to an intermittent torsion along the vascular pedicle. Although initially reluctant to undergo pelvic imaging and workup, Zuleima does express understanding of her differential diagnosis and risks of requiring emergent surgery or ovarian loss should torsion occur. Patient plans to follow-up accordingly and strict return precautions were reviewed prior to discharge.      Review of Systems   Constitutional:  Negative for chills and fever.   HENT:  Negative for congestion, sinus pressure and sinus pain.    Eyes:  Negative for visual disturbance.   Respiratory:  Negative for cough, shortness of breath and wheezing.    Cardiovascular:  Negative for chest pain, palpitations and leg swelling.   Gastrointestinal:  Positive for abdominal pain (localized to RLQ). Negative for constipation, diarrhea, nausea and vomiting.   Genitourinary:  Negative for dysuria, vaginal bleeding and vaginal discharge.   Skin:  Negative for color change, pallor and rash.   Neurological:  Negative for weakness and light-headedness.  "  Psychiatric/Behavioral:  Negative for agitation and behavioral problems.        Historical Information   Past Medical History:   Diagnosis Date    GERD (gastroesophageal reflux disease)     Urinary tract infection      Past Surgical History:   Procedure Laterality Date    TONSILLECTOMY AND ADENOIDECTOMY Bilateral      OB History   No obstetric history on file.     Family History   Problem Relation Age of Onset    Heart disease Father     Heart disease Maternal Grandfather     Prostate cancer Maternal Grandfather     Stroke Maternal Grandmother     Heart disease Maternal Grandmother     Diabetes Maternal Grandmother     Breast cancer Maternal Grandmother     Cancer Maternal Grandmother     Mental illness Maternal Aunt     Heart disease Maternal Aunt     Bipolar disorder Maternal Aunt     Heart disease Maternal Uncle      Social History   Social History     Substance and Sexual Activity   Alcohol Use Not Currently    Alcohol/week: 1.0 standard drink of alcohol    Types: 1 Glasses of wine per week    Comment: 1 glass every 6 months or more I am not a drinker     Social History     Substance and Sexual Activity   Drug Use Never     Social History     Tobacco Use   Smoking Status Never   Smokeless Tobacco Never       Meds/Allergies   No current facility-administered medications for this encounter.       Allergies   Allergen Reactions    Nsaids Anaphylaxis and Hives    Bismuth Subsalicylate Hives     Pt states \"I can take OTC meds for my stomach, but no prescriptions.\" Pt reports her reaction is hives, \"my throat closes off.\"    Ibuprofen Hives and GI Bleeding     Pt reports it makes her stomach bleed.         OBJECTIVE:    Vitals: Blood pressure 150/89, pulse (!) 113, temperature 97.9 °F (36.6 °C), temperature source Oral, resp. rate 18, weight 56.6 kg (124 lb 12.8 oz), last menstrual period 12/17/2023, SpO2 100%. Body mass index is 21.98 kg/m².    Physical Exam  Vitals and nursing note reviewed. Exam conducted with a " chaperone present.   Constitutional:       General: She is not in acute distress.  HENT:      Head: Normocephalic.      Right Ear: External ear normal.      Left Ear: External ear normal.   Eyes:      General: No scleral icterus.        Right eye: No discharge.         Left eye: No discharge.      Conjunctiva/sclera: Conjunctivae normal.   Cardiovascular:      Rate and Rhythm: Normal rate and regular rhythm.      Pulses: Normal pulses.      Heart sounds: Normal heart sounds.   Pulmonary:      Effort: Pulmonary effort is normal. No respiratory distress.      Breath sounds: Normal breath sounds.   Abdominal:      General: There is no distension.      Palpations: Abdomen is soft.      Tenderness: There is abdominal tenderness in the right lower quadrant. There is guarding and rebound. Negative signs include Lemus's sign and McBurney's sign.   Musculoskeletal:         General: No swelling or tenderness. Normal range of motion.      Cervical back: Normal range of motion.      Right lower leg: No edema.      Left lower leg: No edema.   Skin:     General: Skin is warm and dry.      Capillary Refill: Capillary refill takes less than 2 seconds.   Neurological:      Mental Status: She is alert and oriented to person, place, and time. Mental status is at baseline.   Psychiatric:         Mood and Affect: Mood normal.         Behavior: Behavior normal.           Lab Results:   Recent Results (from the past 24 hour(s))   CBC and differential    Collection Time: 01/07/24  4:28 PM   Result Value Ref Range    WBC 10.07 4.31 - 10.16 Thousand/uL    RBC 4.78 3.81 - 5.12 Million/uL    Hemoglobin 13.8 11.5 - 15.4 g/dL    Hematocrit 41.7 34.8 - 46.1 %    MCV 87 82 - 98 fL    MCH 28.9 26.8 - 34.3 pg    MCHC 33.1 31.4 - 37.4 g/dL    RDW 12.6 11.6 - 15.1 %    MPV 11.1 8.9 - 12.7 fL    Platelets 272 149 - 390 Thousands/uL    nRBC 0 /100 WBCs    Neutrophils Relative 67 43 - 75 %    Immat GRANS % 1 0 - 2 %    Lymphocytes Relative 24 14 - 44 %     Monocytes Relative 6 4 - 12 %    Eosinophils Relative 1 0 - 6 %    Basophils Relative 1 0 - 1 %    Neutrophils Absolute 6.85 1.85 - 7.62 Thousands/µL    Immature Grans Absolute 0.05 0.00 - 0.20 Thousand/uL    Lymphocytes Absolute 2.46 0.60 - 4.47 Thousands/µL    Monocytes Absolute 0.55 0.17 - 1.22 Thousand/µL    Eosinophils Absolute 0.11 0.00 - 0.61 Thousand/µL    Basophils Absolute 0.05 0.00 - 0.10 Thousands/µL   Comprehensive metabolic panel    Collection Time: 01/07/24  4:28 PM   Result Value Ref Range    Sodium 140 135 - 147 mmol/L    Potassium 3.5 3.5 - 5.3 mmol/L    Chloride 101 96 - 108 mmol/L    CO2 24 21 - 32 mmol/L    ANION GAP 15 mmol/L    BUN 12 5 - 25 mg/dL    Creatinine 0.63 0.60 - 1.30 mg/dL    Glucose 85 65 - 140 mg/dL    Calcium 9.0 8.4 - 10.2 mg/dL    AST 14 13 - 39 U/L    ALT 14 7 - 52 U/L    Alkaline Phosphatase 45 34 - 104 U/L    Total Protein 7.4 6.4 - 8.4 g/dL    Albumin 4.4 3.5 - 5.0 g/dL    Total Bilirubin 0.30 0.20 - 1.00 mg/dL    eGFR 121 ml/min/1.73sq m   Lipase    Collection Time: 01/07/24  4:28 PM   Result Value Ref Range    Lipase 32 11 - 82 u/L   UA w Reflex to Microscopic w Reflex to Culture    Collection Time: 01/07/24  4:33 PM    Specimen: Urine, Clean Catch   Result Value Ref Range    Color, UA Light Yellow     Clarity, UA Clear     Specific Gravity, UA 1.024 1.003 - 1.030    pH, UA 5.5 4.5, 5.0, 5.5, 6.0, 6.5, 7.0, 7.5, 8.0    Leukocytes, UA Negative Negative    Nitrite, UA Negative Negative    Protein, UA Negative Negative mg/dl    Glucose, UA Negative Negative mg/dl    Ketones, UA 20 (1+) (A) Negative mg/dl    Urobilinogen, UA <2.0 <2.0 mg/dl mg/dl    Bilirubin, UA Negative Negative    Occult Blood, UA Trace (A) Negative   Urine Microscopic    Collection Time: 01/07/24  4:33 PM   Result Value Ref Range    RBC, UA 2-4 (A) None Seen, 1-2 /hpf    WBC, UA 1-2 None Seen, 1-2 /hpf    Epithelial Cells Occasional None Seen, Occasional /hpf    Bacteria, UA Moderate (A) None Seen,  Occasional /hpf    MUCUS THREADS Moderate (A) None Seen   POCT pregnancy, urine    Collection Time: 01/07/24  4:37 PM   Result Value Ref Range    EXT Preg Test, Ur Negative     Control Valid        Imaging Studies: I have personally reviewed pertinent reports.      EKG, Pathology, and Other Studies: I have personally reviewed pertinent reports.        Brice Carty MD  OB/GYN PGY-3  1/7/2024  8:44 PM

## 2024-03-01 ENCOUNTER — OFFICE VISIT (OUTPATIENT)
Dept: FAMILY MEDICINE CLINIC | Facility: CLINIC | Age: 29
End: 2024-03-01
Payer: COMMERCIAL

## 2024-03-01 VITALS
HEART RATE: 84 BPM | HEIGHT: 63 IN | BODY MASS INDEX: 22.71 KG/M2 | OXYGEN SATURATION: 99 % | SYSTOLIC BLOOD PRESSURE: 124 MMHG | DIASTOLIC BLOOD PRESSURE: 68 MMHG | TEMPERATURE: 97.6 F | WEIGHT: 128.2 LBS

## 2024-03-01 DIAGNOSIS — F41.9 ANXIETY: Primary | ICD-10-CM

## 2024-03-01 PROCEDURE — 99214 OFFICE O/P EST MOD 30 MIN: CPT | Performed by: FAMILY MEDICINE

## 2024-03-01 RX ORDER — ESCITALOPRAM OXALATE 5 MG/1
5 TABLET ORAL DAILY
Qty: 30 TABLET | Refills: 1 | Status: SHIPPED | OUTPATIENT
Start: 2024-03-01

## 2024-03-03 NOTE — PROGRESS NOTES
Assessment/Plan:    No problem-specific Assessment & Plan notes found for this encounter.       Diagnoses and all orders for this visit:    Anxiety  -     escitalopram (LEXAPRO) 5 mg tablet; Take 1 tablet (5 mg total) by mouth daily        - CODI-7 screening positive with a score of 16 indicative of severe depression. Patient declines referral to psych services or social work for outpatient mental health resources. Discussed starting on a lexapro 5mg especially as her grandmother is on this medication and patient is agreeable. Patient advised to reach out should she experience any side effects. Follow up in 4 weeks.     Subjective:      Patient ID: Zuleima Mcdonough is a 29 y.o. female.    HPI  Zuleima Mcdonough is a very pleasant 29 year old female who presents today with a chief complaint of anxiety. Patient states that she started to suffer from anxiety since the age of 17 but it has worsening over the past 3-4 years. Recently has started to notice that her anxiety has spread into social situations which prompted her to make this visit. She finds herself worrying about different especially her pets. She states that she typically likes to go to the gym but has stopped doing that as she worries about something happening to them. She states that she spoke briefly with her previous GYN about this who then started her on Lexapro 20mg and she had a terrible reaction to being on such a high dose of the medication. She subsequently stopped it. She does report a family history of anxiety in her grandmother who is also on lexapro and her aunt who also had bipolar disorder. She states that she is not interested in trying therapy but is interested in pharmacotherapy to see if it would help.    The following portions of the patient's history were reviewed and updated as appropriate: allergies, current medications, past family history, past medical history, past social history, past surgical history, and problem list.    Review of  "Systems   Constitutional: Negative.    HENT: Negative.     Eyes: Negative.    Respiratory: Negative.     Cardiovascular: Negative.    Gastrointestinal: Negative.    Genitourinary: Negative.    Musculoskeletal: Negative.    Skin: Negative.    Neurological: Negative.    Psychiatric/Behavioral:  The patient is nervous/anxious.          Objective:      /68 (BP Location: Left arm, Patient Position: Sitting, Cuff Size: Standard)   Pulse 84   Temp 97.6 °F (36.4 °C) (Temporal)   Ht 5' 3.18\" (1.605 m)   Wt 58.2 kg (128 lb 3.2 oz)   SpO2 99%   BMI 22.58 kg/m²          Physical Exam  Constitutional:       General: She is not in acute distress.     Appearance: She is not ill-appearing.   HENT:      Head: Normocephalic and atraumatic.   Eyes:      General:         Right eye: No discharge.         Left eye: No discharge.      Extraocular Movements: Extraocular movements intact.   Cardiovascular:      Rate and Rhythm: Normal rate.   Pulmonary:      Effort: No respiratory distress.   Neurological:      General: No focal deficit present.      Mental Status: She is alert.   Psychiatric:         Mood and Affect: Mood normal.         Behavior: Behavior normal.           "

## 2024-04-20 DIAGNOSIS — R39.9 UTI SYMPTOMS: Primary | ICD-10-CM

## 2024-04-24 ENCOUNTER — OFFICE VISIT (OUTPATIENT)
Dept: FAMILY MEDICINE CLINIC | Facility: CLINIC | Age: 29
End: 2024-04-24
Payer: COMMERCIAL

## 2024-04-24 VITALS
BODY MASS INDEX: 22.39 KG/M2 | SYSTOLIC BLOOD PRESSURE: 136 MMHG | HEIGHT: 63 IN | DIASTOLIC BLOOD PRESSURE: 80 MMHG | OXYGEN SATURATION: 99 % | TEMPERATURE: 97.6 F | HEART RATE: 84 BPM | WEIGHT: 126.4 LBS

## 2024-04-24 DIAGNOSIS — B37.9 CANDIDA INFECTION: ICD-10-CM

## 2024-04-24 DIAGNOSIS — R39.9 UTI SYMPTOMS: Primary | ICD-10-CM

## 2024-04-24 LAB
SL AMB  POCT GLUCOSE, UA: NEGATIVE
SL AMB LEUKOCYTE ESTERASE,UA: NEGATIVE
SL AMB POCT BILIRUBIN,UA: NEGATIVE
SL AMB POCT BLOOD,UA: ABNORMAL
SL AMB POCT CLARITY,UA: ABNORMAL
SL AMB POCT COLOR,UA: YELLOW
SL AMB POCT KETONES,UA: NEGATIVE
SL AMB POCT NITRITE,UA: ABNORMAL
SL AMB POCT PH,UA: 6
SL AMB POCT SPECIFIC GRAVITY,UA: 1.02
SL AMB POCT URINE PROTEIN: ABNORMAL
SL AMB POCT UROBILINOGEN: 0.2

## 2024-04-24 PROCEDURE — 87086 URINE CULTURE/COLONY COUNT: CPT | Performed by: FAMILY MEDICINE

## 2024-04-24 PROCEDURE — 99213 OFFICE O/P EST LOW 20 MIN: CPT | Performed by: FAMILY MEDICINE

## 2024-04-24 PROCEDURE — 81002 URINALYSIS NONAUTO W/O SCOPE: CPT | Performed by: FAMILY MEDICINE

## 2024-04-24 RX ORDER — NITROFURANTOIN 25; 75 MG/1; MG/1
100 CAPSULE ORAL 2 TIMES DAILY
Qty: 10 CAPSULE | Refills: 0 | Status: SHIPPED | OUTPATIENT
Start: 2024-04-24 | End: 2024-04-29

## 2024-04-24 RX ORDER — FLUCONAZOLE 150 MG/1
150 TABLET ORAL ONCE
Qty: 1 TABLET | Refills: 0 | Status: SHIPPED | OUTPATIENT
Start: 2024-04-24 | End: 2024-04-24

## 2024-04-24 NOTE — PROGRESS NOTES
Assessment/Plan:    No problem-specific Assessment & Plan notes found for this encounter.       Diagnoses and all orders for this visit:    UTI symptoms  -     POCT urine dip  -     Urine culture  -     nitrofurantoin (MACROBID) 100 mg capsule; Take 1 capsule (100 mg total) by mouth 2 (two) times a day for 5 days    Candida infection  -     fluconazole (DIFLUCAN) 150 mg tablet; Take 1 tablet (150 mg total) by mouth once for 1 dose        POCT urine dip positive for blood; will send for culture and start Macrobid in the meantime. Diflucan sent for presumptive yeast infection.    Subjective:      Patient ID: Zuleima Mcdonough is a 29 y.o. female.    Urinary Tract Infection   This is a new problem. The current episode started in the past 7 days. The problem has been unchanged. The quality of the pain is described as burning. There has been no fever. Associated symptoms include a discharge, frequency and urgency. Pertinent negatives include no chills, flank pain, hematuria, nausea or vomiting. Treatments tried: OTC medications. The treatment provided no relief.       In addition to the UTI symptoms patient also reports that she has yeast infection and states that she is prone to getting them.    The following portions of the patient's history were reviewed and updated as appropriate: allergies, current medications, past family history, past medical history, past social history, past surgical history, and problem list.    Review of Systems   Constitutional:  Negative for chills and fever.   HENT: Negative.     Respiratory: Negative.     Gastrointestinal:  Negative for nausea and vomiting.   Genitourinary:  Positive for dysuria, frequency, urgency and vaginal discharge. Negative for flank pain and hematuria.   Musculoskeletal: Negative.    Skin: Negative.    Neurological: Negative.    Psychiatric/Behavioral: Negative.           Objective:      /80 (BP Location: Left arm, Patient Position: Sitting, Cuff Size: Standard)   " Pulse 84   Temp 97.6 °F (36.4 °C) (Temporal)   Ht 5' 3.18\" (1.605 m)   Wt 57.3 kg (126 lb 6.4 oz)   SpO2 99%   BMI 22.26 kg/m²          Physical Exam  Constitutional:       General: She is not in acute distress.     Appearance: She is not ill-appearing.   HENT:      Head: Normocephalic and atraumatic.   Eyes:      General:         Right eye: No discharge.         Left eye: No discharge.      Extraocular Movements: Extraocular movements intact.   Cardiovascular:      Rate and Rhythm: Normal rate.   Pulmonary:      Effort: Pulmonary effort is normal. No respiratory distress.      Breath sounds: No wheezing.   Abdominal:      General: Bowel sounds are normal. There is no distension.      Palpations: Abdomen is soft.      Tenderness: There is no abdominal tenderness. There is no right CVA tenderness, left CVA tenderness or guarding.   Genitourinary:     Comments: Declines pelvic exam  Neurological:      General: No focal deficit present.      Mental Status: She is alert.   Psychiatric:         Mood and Affect: Mood normal.         Behavior: Behavior normal.           "

## 2024-04-26 LAB — BACTERIA UR CULT: NORMAL

## 2024-07-15 ENCOUNTER — NURSE TRIAGE (OUTPATIENT)
Age: 29
End: 2024-07-15

## 2024-07-15 NOTE — TELEPHONE ENCOUNTER
"Spoke with patient who reports hx 4.8cm right ovarian cyst, dx 1/2024.  She reports 7/12 she had similar pain that was intermittent.  Today, pain has been constant.  She rates it 5/10, unable to take NSAIDS, tylenol only taking edge off the pain.   She denies any other symptoms.  LMP 6/30, denies pregnancy.  Appointment made for today.  No further questions or concerns at this time.    Reason for Disposition   MODERATE pain (e.g., interferes with normal activities that comes and goes (cramps) lasts > 24 hours (Exception: pain with Vomiting or Diarrhea - see that Protocol)    Answer Assessment - Initial Assessment Questions  1. LOCATION: \"Where does it hurt?\"       RLQ  2. RADIATION: \"Does the pain shoot anywhere else?\" (e.g., chest, back)      denies  3. ONSET: \"When did the pain begin?\" (e.g., minutes, hours or days ago)       7/12, had been intermittent, however now is constant.  4. SUDDEN: \"Gradual or sudden onset?\"      Gradual  5. PATTERN \"Does the pain come and go, or is it constant?\"     - If constant: \"Is it getting better, staying the same, or worsening?\"       (Note: Constant means the pain never goes away completely; most serious pain is constant and it progresses)      - If intermittent: \"How long does it last?\" \"Do you have pain now?\"      (Note: Intermittent means the pain goes away completely between bouts)      constant  6. SEVERITY: \"How bad is the pain?\"  (e.g., Scale 1-10; mild, moderate, or severe)    - MILD (1-3): doesn't interfere with normal activities, abdomen soft and not tender to touch     - MODERATE (4-7): interferes with normal activities or awakens from sleep, tender to touch     - SEVERE (8-10): excruciating pain, doubled over, unable to do any normal activities       5/10  7. RECURRENT SYMPTOM: \"Have you ever had this type of stomach pain before?\" If Yes, ask: \"When was the last time?\" and \"What happened that time?\"       Yes 1/2024, ovarian cyst  8. CAUSE: \"What do you think is " "causing the stomach pain?\"      Cyst?  9. RELIEVING/AGGRAVATING FACTORS: \"What makes it better or worse?\" (e.g., movement, antacids, bowel movement)      Laying down helps  10. OTHER SYMPTOMS: \"Has there been any vomiting, diarrhea, constipation, or urine problems?\"        denies  11. PREGNANCY: \"Is there any chance you are pregnant?\" \"When was your last menstrual period?\"        6/30, denies    Protocols used: Abdominal Pain - Female-ADULT-[place]    "

## 2024-09-19 ENCOUNTER — OFFICE VISIT (OUTPATIENT)
Age: 29
End: 2024-09-19
Payer: COMMERCIAL

## 2024-09-19 ENCOUNTER — TELEPHONE (OUTPATIENT)
Age: 29
End: 2024-09-19

## 2024-09-19 VITALS
DIASTOLIC BLOOD PRESSURE: 82 MMHG | HEIGHT: 63 IN | WEIGHT: 127.4 LBS | BODY MASS INDEX: 22.57 KG/M2 | SYSTOLIC BLOOD PRESSURE: 122 MMHG

## 2024-09-19 DIAGNOSIS — Z11.3 SCREENING FOR STD (SEXUALLY TRANSMITTED DISEASE): ICD-10-CM

## 2024-09-19 DIAGNOSIS — Z30.41 ENCOUNTER FOR SURVEILLANCE OF CONTRACEPTIVE PILLS: ICD-10-CM

## 2024-09-19 DIAGNOSIS — Z11.51 SCREENING FOR HUMAN PAPILLOMAVIRUS (HPV): ICD-10-CM

## 2024-09-19 DIAGNOSIS — R39.15 URINARY URGENCY: ICD-10-CM

## 2024-09-19 DIAGNOSIS — Z01.42 PAP SMEAR TO CONFIRM NORMAL AFTER ABNORMAL RESULT: ICD-10-CM

## 2024-09-19 DIAGNOSIS — Z12.4 SCREENING FOR CERVICAL CANCER: ICD-10-CM

## 2024-09-19 DIAGNOSIS — Z01.419 ENCOUNTER FOR ANNUAL ROUTINE GYNECOLOGICAL EXAMINATION: Primary | ICD-10-CM

## 2024-09-19 PROBLEM — Z80.41 FH: OVARIAN CANCER: Status: ACTIVE | Noted: 2023-10-25

## 2024-09-19 PROBLEM — R87.810 HUMAN PAPILLOMAVIRUS (HPV) TYPE 16 DNA DETECTED IN CERVICAL SPECIMEN: Status: ACTIVE | Noted: 2024-09-19

## 2024-09-19 PROBLEM — Z82.49 FH: CAD (CORONARY ARTERY DISEASE): Status: RESOLVED | Noted: 2023-05-18 | Resolved: 2024-09-19

## 2024-09-19 PROCEDURE — 99385 PREV VISIT NEW AGE 18-39: CPT | Performed by: OBSTETRICS & GYNECOLOGY

## 2024-09-19 RX ORDER — NORGESTIMATE AND ETHINYL ESTRADIOL 7DAYSX3 28
1 KIT ORAL DAILY
Qty: 84 TABLET | Refills: 3 | Status: SHIPPED | OUTPATIENT
Start: 2024-09-19

## 2024-09-19 NOTE — PROGRESS NOTES
Assessment/Plan:      1. Screening for human papillomavirus (HPV)    - IGP,CtNg,AptimaHPV,rfx16/18,45    2. Screening for cervical cancer    - IGP,CtNg,AptimaHPV,rfx16/18,45    3. Encounter for annual routine gynecological examination      4. Urinary urgency    - Urinalysis with microscopic  - Urine culture    5. Pap smear to confirm normal after abnormal result    - IGP,CtNg,AptimaHPV,rfx16/18,45    6. Encounter for surveillance of contraceptive pills    - Tri-Sprintec 0.18/0.215/0.25 MG-35 MCG per tablet; Take 1 tablet by mouth daily  Dispense: 84 tablet; Refill: 3    7. Screening for STD (sexually transmitted disease)    - IGP,CtNg,AptimaHPV,rfx16/18,45           Subjective      Zuleima Mcdonough is a 29 y.o. female who presents for annual exam. Periods are regular on OCP's.  She gets frequent UTI's and vaginitis; reports urinary urgency today.  She denies any breast concerns.  Accepts STD screening.  Diagnosed with an ovarian cyst in 1/2024; no follow up sono done.  Has intermittent pain.    Current contraception: OCP (estrogen/progesterone)  History of abnormal Pap smear: yes   Regular self breast exam: yes  History of abnormal mammogram: no  History of abnormal lipids: no    Menstrual History:  Nulligravida  Menarche age: 11  Patient's last menstrual period was 08/21/2024 (exact date).  Period Cycle (Days): 28  Period Duration (Days): 7  Period Pattern: Regular  Menstrual Flow: Heavy, Moderate, Light (Heavy x3 days some months)  Menstrual Control: Tampon, Maxi pad  Menstrual Control Change Freq (Hours): changes for convience  Dysmenorrhea: (!) Moderate  Dysmenorrhea Symptoms: Cramping, Nausea, Other (Comment) (bloating)    Past Medical History:   Diagnosis Date    ASCUS with positive high risk HPV cervical 08/23/2022    ASCUS / HPV HR+ / HPV 16+    GERD (gastroesophageal reflux disease)     HPV (human papilloma virus) infection 09/06/2023    Pap: NILM/ HPV HR+/HPV 16+    Urinary tract infection        Family  "History   Problem Relation Age of Onset    Heart disease Father     Stroke Maternal Grandmother     Heart disease Maternal Grandmother     Diabetes Maternal Grandmother     Breast cancer Maternal Grandmother     Cancer Maternal Grandmother     Asthma Maternal Grandmother     Ovarian cancer Maternal Grandmother     Uterine cancer Maternal Grandmother     Clotting disorder Maternal Grandmother     Diabetes Maternal Grandfather     Cancer Maternal Grandfather     Heart disease Maternal Grandfather     Prostate cancer Maternal Grandfather     Mental illness Maternal Aunt     Heart disease Maternal Aunt     Bipolar disorder Maternal Aunt     Heart disease Maternal Uncle        The following portions of the patient's history were reviewed and updated as appropriate: allergies, current medications, past family history, past medical history, past social history, past surgical history, and problem list.    Review of Systems  Pertinent items are noted in HPI.      Objective      /82 (BP Location: Left arm, Patient Position: Sitting, Cuff Size: Standard)   Ht 5' 2.75\" (1.594 m)   Wt 57.8 kg (127 lb 6.4 oz)   LMP 08/21/2024 (Exact Date)   BMI 22.75 kg/m²     General:   alert and oriented, in no acute distress   Heart:  Breasts: regular rate and rhythm  appear normal, no suspicious masses, no skin or nipple changes or axillary nodes.   Lungs: Effort normal   Abdomen: soft, non-tender, without masses or organomegaly   Vulva: normal   Vagina: normal mucosa   Cervix: no lesions   Uterus: normal size, mobile, non-tender   Adnexa:  Bladder: normal adnexa and no mass, fullness, tenderness  Non-tender               "

## 2024-09-19 NOTE — TELEPHONE ENCOUNTER
Patient called Rx refill line she is asking about her urine results - she states she Is having uti symptoms and is asking if something will be called into her pharmacy today??  Please call pt when complete    [facility] Wan

## 2024-09-20 DIAGNOSIS — R39.15 URINARY URGENCY: Primary | ICD-10-CM

## 2024-09-20 RX ORDER — NITROFURANTOIN 25; 75 MG/1; MG/1
100 CAPSULE ORAL 2 TIMES DAILY
Qty: 14 CAPSULE | Refills: 0 | Status: SHIPPED | OUTPATIENT
Start: 2024-09-20 | End: 2024-09-27

## 2024-09-20 NOTE — TELEPHONE ENCOUNTER
Spoke with [facility] who has the patient on the other line. Is to make patient aware that her urine has not been resulted yet, as it was sent out. Relayed that Dr. James is in surgery right now and we will give her the message when back in the office.

## 2024-09-23 ENCOUNTER — TELEPHONE (OUTPATIENT)
Age: 29
End: 2024-09-23

## 2024-09-23 NOTE — TELEPHONE ENCOUNTER
Patient called to see the status of her labs that were collected on 09/19/2024. I informed patient no results are back yet and that she would she results on her mychart. Patient appreciates information and no further questions at this time .

## 2024-09-26 NOTE — TELEPHONE ENCOUNTER
Patient called she seen her results for her pap on her mychart. I did inform patient that we asked for 48-72 hours for the provider to interpret results and someone will reach out to patient. Patient understands and will await phone call regarding her pap results.

## 2024-09-27 ENCOUNTER — TELEPHONE (OUTPATIENT)
Age: 29
End: 2024-09-27

## 2024-10-07 ENCOUNTER — CONSULT (OUTPATIENT)
Age: 29
End: 2024-10-07
Payer: COMMERCIAL

## 2024-10-07 VITALS
WEIGHT: 126.4 LBS | HEIGHT: 63 IN | SYSTOLIC BLOOD PRESSURE: 124 MMHG | BODY MASS INDEX: 22.39 KG/M2 | DIASTOLIC BLOOD PRESSURE: 80 MMHG

## 2024-10-07 DIAGNOSIS — R87.810 HUMAN PAPILLOMAVIRUS (HPV) TYPE 18 OR 45 DNA DETECTED IN CERVICAL SPECIMEN: ICD-10-CM

## 2024-10-07 DIAGNOSIS — R87.810 HUMAN PAPILLOMAVIRUS (HPV) TYPE 16 DNA DETECTED IN CERVICAL SPECIMEN: Primary | ICD-10-CM

## 2024-10-07 PROCEDURE — 99213 OFFICE O/P EST LOW 20 MIN: CPT | Performed by: OBSTETRICS & GYNECOLOGY

## 2024-10-07 NOTE — PROGRESS NOTES
Zuleima Mcdonough  1995  [facility] [place]  [facility] [place]  [address] [place]  [address]  [facility] [address] [phone]    Chief Complaint   Patient presents with    Consult      Pap: 9/26/24 ASCUS / HPV HR+/ HPV 16/18+       Assessment & Plan     Discussed need for diagnostic testing prior to any definitive treatment via surgery.  Patient wishes to eventually have a hysterectomy but understands need for colposcopic-directed biopsies.  Reviewed option to do a see-and-treat LEEP to decrease them need for multiple diagnostic procedures.  Consent signed for EUA, colposcopy and LEEP.  Recovery expectations reviewed.  Wash and instructions given.        History of Present Illness: Patient is s/p Pap with HPV co-testing that revealed normal cytology with +HR-HPV 16, 18/45.  She wishes to proceed with further testing and definitive treatment under anesthesia due to h/o traumatic colposcopy.  No recent abnormal bleeding.          Past Medical History:   Diagnosis Date    ASCUS with positive high risk HPV cervical 08/23/2022    ASCUS / HPV HR+ / HPV 16+    GERD (gastroesophageal reflux disease)     HPV (human papilloma virus) infection 09/06/2023    Pap: NILM/ HPV HR+/HPV 16+    Urinary tract infection        Past Surgical History:   Procedure Laterality Date    COLPOSCOPY  09/21/2022    NILM    TONSILLECTOMY AND ADENOIDECTOMY Bilateral     WISDOM TOOTH EXTRACTION         Nulligravida      Family History   Problem Relation Age of Onset    Heart disease Father     Stroke Maternal Grandmother     Heart disease Maternal Grandmother     Diabetes Maternal Grandmother     Breast cancer Maternal Grandmother     Cancer Maternal Grandmother     Asthma Maternal Grandmother     Ovarian cancer Maternal Grandmother     Uterine cancer Maternal Grandmother     Clotting disorder Maternal Grandmother     Diabetes Maternal Grandfather     Cancer Maternal Grandfather     Heart disease Maternal Grandfather      "Prostate cancer Maternal Grandfather     Mental illness Maternal Aunt     Heart disease Maternal Aunt     Bipolar disorder Maternal Aunt     Heart disease Maternal Uncle        Social History     Socioeconomic History    Marital status: Single     Spouse name: Not on file    Number of children: Not on file    Years of education: Not on file    Highest education level: Not on file   Occupational History    Not on file   Tobacco Use    Smoking status: Never    Smokeless tobacco: Never   Vaping Use    Vaping status: Never Used   Substance and Sexual Activity    Alcohol use: Not Currently     Alcohol/week: 1.0 standard drink of alcohol     Types: 1 Glasses of wine per week     Comment: 1 glass every 6 months or more I am not a drinker    Drug use: Never    Sexual activity: Yes     Partners: Male     Birth control/protection: OCP     Comment: Birth control pill   Other Topics Concern    Not on file   Social History Narrative    Not on file     Social Determinants of Health     Financial Resource Strain: Not on file   Food Insecurity: Not on file   Transportation Needs: Not on file   Physical Activity: Not on file   Stress: Not on file   Social Connections: Not on file   Intimate Partner Violence: Not on file   Housing Stability: Not on file         Current Outpatient Medications:     Tri-Sprintec 0.18/0.215/0.25 MG-35 MCG per tablet, Take 1 tablet by mouth daily, Disp: 84 tablet, Rfl: 3    Allergies   Allergen Reactions    Nsaids Anaphylaxis and Hives    Bismuth Subsalicylate Hives     Pt states \"I can take OTC meds for my stomach, but no prescriptions.\" Pt reports her reaction is hives, \"my throat closes off.\"    Ibuprofen Hives and GI Bleeding     Pt reports it makes her stomach bleed.       ROS:  negative        Physical Exam  Constitutional:       Appearance: Normal appearance.   HENT:      Head: Normocephalic.   Cardiovascular:      Rate and Rhythm: Normal rate and regular rhythm.   Pulmonary:      Effort: Pulmonary " effort is normal.   Abdominal:      Palpations: Abdomen is soft.      Tenderness: There is no abdominal tenderness.   Musculoskeletal:         General: No swelling.   Neurological:      General: No focal deficit present.      Mental Status: She is alert and oriented to person, place, and time.   Skin:     General: Skin is warm and dry.   Psychiatric:         Mood and Affect: Mood normal.         Behavior: Behavior normal.   Vitals reviewed.

## 2024-10-10 ENCOUNTER — TELEPHONE (OUTPATIENT)
Age: 29
End: 2024-10-10

## 2024-10-10 NOTE — TELEPHONE ENCOUNTER
Pt called. Pt was under the impression she would've been scheduled at her 10/07 pre op visit for procedure in OR. Pt made aware Tana was not in on that day and could take some time basad on receiving consent from provider and what OR has available. Spoke with Roya from  to confirm it is Tana Knapp who would be scheduling pt in OR. Pt verbalized understanding and will wait to hear from Tana.

## 2024-10-14 ENCOUNTER — TELEPHONE (OUTPATIENT)
Age: 29
End: 2024-10-14

## 2024-10-14 NOTE — TELEPHONE ENCOUNTER
----- Message from Sandra Rosenbaum MD sent at 10/7/2024  1:53 PM EDT -----  Regarding: surgery  [facility] GYN Department  Surgery Scheduling Sheet    Patient Name: Zuleima Mcdonough  : 1995    Provider: Sandra Rosenbaum MD     Needed: no; Language: N/A    Procedure: exam under anesthesia, loop electrosurgical excision procedure, and colposcopy    Diagnosis: Positive HR-HPV 16 and 18/45 status    Special Needs or Equipment: colposcope    Anesthesia: IV sedation with anesthesia    Length of stay: outpatient  Does patient have comorbid conditions that will require close perioperative monitoring prior to safe discharge: no    The patient has comorbid conditions that will require close perioperative monitoring prior to safe discharge, including N/A.   This may require acute care beyond the usual and routine recovery period. As such, inpatient admission post-operatively is expected and appropriate, and anticipated hospital length of stay will be >2 midnights.    Pre-Admission Testing Needed: no   Labs that should be ordered: urine pregnancy test    Order PAT that is recommended in prep for procedure?: Not Indicated    Medical Clearance Needed: no; Provider: N/A    MA Form Signed (tubals/hysterectomy): Not Indicated    Surgical Drink Given: no     How many days out of work: 2 day(s)     How many days no drivin day(s)       Is pre op appt needed?  no  Interval for post op appt: 2 week(s)     For Surgical Scheduler:     Surgery Scheduled On:  [place]:     Pre-op Appt:   Post op Appt:  Consult/Medical clearance appt:

## 2024-10-15 NOTE — PRE-PROCEDURE INSTRUCTIONS
Pre-Surgery Instructions:   Medication Instructions    Tri-Sprintec 0.18/0.215/0.25 MG-35 MCG per tablet Take night before surgery      Medication instructions for day surgery reviewed. Please use only a sip of water to take your instructed medications. Avoid all over the counter vitamins, supplements and NSAIDS for one week prior to surgery per anesthesia guidelines. Tylenol is ok to take as needed.     You will receive a call one business day prior to surgery with an arrival time and hospital directions. If your surgery is scheduled on a Monday, the hospital will be calling you on the Friday prior to your surgery. If you have not heard from anyone by 8pm, please call the hospital supervisor through the hospital  at [phone]. ([place] [phone] or [place] [phone]).    Do not eat or drink anything after midnight the night before your surgery, including candy, mints, lifesavers, or chewing gum. Do not drink alcohol 24hrs before your surgery. Try not to smoke at least 24hrs before your surgery.       Follow the pre surgery showering instructions as listed in the “My Surgical Experience Booklet” or otherwise provided by your surgeon's office. Do not use a blade to shave the surgical area 1 week before surgery. It is okay to use a clean electric clippers up to 24 hours before surgery. Do not apply any lotions, creams, including makeup, cologne, deodorant, or perfumes after showering on the day of your surgery. Do not use dry shampoo, hair spray, hair gel, or any type of hair products.     No contact lenses, eye make-up, or artificial eyelashes. Remove nail polish, including gel polish, and any artificial, gel, or acrylic nails if possible. Remove all jewelry including rings and body piercing jewelry.     Wear causal clothing that is easy to take on and off. Consider your type of surgery.    Keep any valuables, jewelry, piercings at home. Please bring any specially ordered equipment (sling, braces)  if indicated.    Arrange for a responsible person to drive you to and from the hospital on the day of your surgery. Please confirm the visitor policy for the day of your procedure when you receive your phone call with an arrival time.     Call the surgeon's office with any new illnesses, exposures, or additional questions prior to surgery.    Please reference your “My Surgical Experience Booklet” for additional information to prepare for your upcoming surgery.

## 2024-10-16 LAB
HPV16 DNA CVX QL PROBE+SIG AMP: POSITIVE
HPV18+45 E6+E7 MRNA CVX QL NAA+PROBE: POSITIVE

## 2024-10-17 ENCOUNTER — HOSPITAL ENCOUNTER (OUTPATIENT)
Facility: HOSPITAL | Age: 29
Setting detail: OUTPATIENT SURGERY
Discharge: HOME | End: 2024-10-17
Attending: OBSTETRICS & GYNECOLOGY | Admitting: OBSTETRICS & GYNECOLOGY
Payer: COMMERCIAL

## 2024-10-17 ENCOUNTER — ANESTHESIA (OUTPATIENT)
Dept: PERIOP | Facility: HOSPITAL | Age: 29
End: 2024-10-17
Payer: COMMERCIAL

## 2024-10-17 ENCOUNTER — ANESTHESIA EVENT (OUTPATIENT)
Dept: PERIOP | Facility: HOSPITAL | Age: 29
End: 2024-10-17
Payer: COMMERCIAL

## 2024-10-17 VITALS
RESPIRATION RATE: 16 BRPM | TEMPERATURE: 98.1 F | HEART RATE: 74 BPM | HEIGHT: 63 IN | WEIGHT: 125.8 LBS | OXYGEN SATURATION: 96 % | DIASTOLIC BLOOD PRESSURE: 73 MMHG | BODY MASS INDEX: 22.29 KG/M2 | SYSTOLIC BLOOD PRESSURE: 129 MMHG

## 2024-10-17 DIAGNOSIS — R87.810 HUMAN PAPILLOMAVIRUS (HPV) TYPE 16 DNA DETECTED IN CERVICAL SPECIMEN: ICD-10-CM

## 2024-10-17 PROBLEM — Z98.890 S/P LEEP: Status: ACTIVE | Noted: 2024-10-17

## 2024-10-17 LAB
EXT PREGNANCY TEST URINE: NEGATIVE
EXT. CONTROL: NORMAL

## 2024-10-17 PROCEDURE — 88307 TISSUE EXAM BY PATHOLOGIST: CPT | Performed by: PATHOLOGY

## 2024-10-17 PROCEDURE — 88344 IMHCHEM/IMCYTCHM EA MLT ANTB: CPT | Performed by: PATHOLOGY

## 2024-10-17 PROCEDURE — 88305 TISSUE EXAM BY PATHOLOGIST: CPT | Performed by: PATHOLOGY

## 2024-10-17 PROCEDURE — NC001 PR NO CHARGE: Performed by: OBSTETRICS & GYNECOLOGY

## 2024-10-17 PROCEDURE — 81025 URINE PREGNANCY TEST: CPT | Performed by: OBSTETRICS & GYNECOLOGY

## 2024-10-17 PROCEDURE — 57461 CONZ OF CERVIX W/SCOPE LEEP: CPT | Performed by: OBSTETRICS & GYNECOLOGY

## 2024-10-17 RX ORDER — SODIUM CHLORIDE, SODIUM LACTATE, POTASSIUM CHLORIDE, CALCIUM CHLORIDE 600; 310; 30; 20 MG/100ML; MG/100ML; MG/100ML; MG/100ML
100 INJECTION, SOLUTION INTRAVENOUS CONTINUOUS
Status: CANCELLED | OUTPATIENT
Start: 2024-10-17

## 2024-10-17 RX ORDER — MIDAZOLAM HYDROCHLORIDE 2 MG/2ML
INJECTION, SOLUTION INTRAMUSCULAR; INTRAVENOUS AS NEEDED
Status: DISCONTINUED | OUTPATIENT
Start: 2024-10-17 | End: 2024-10-17

## 2024-10-17 RX ORDER — FENTANYL CITRATE 50 UG/ML
INJECTION, SOLUTION INTRAMUSCULAR; INTRAVENOUS AS NEEDED
Status: DISCONTINUED | OUTPATIENT
Start: 2024-10-17 | End: 2024-10-17

## 2024-10-17 RX ORDER — ONDANSETRON 2 MG/ML
4 INJECTION INTRAMUSCULAR; INTRAVENOUS EVERY 4 HOURS PRN
Status: DISCONTINUED | OUTPATIENT
Start: 2024-10-17 | End: 2024-10-17 | Stop reason: HOSPADM

## 2024-10-17 RX ORDER — PROPOFOL 10 MG/ML
INJECTION, EMULSION INTRAVENOUS CONTINUOUS PRN
Status: DISCONTINUED | OUTPATIENT
Start: 2024-10-17 | End: 2024-10-17

## 2024-10-17 RX ORDER — FENTANYL CITRATE/PF 50 MCG/ML
25 SYRINGE (ML) INJECTION
Status: DISCONTINUED | OUTPATIENT
Start: 2024-10-17 | End: 2024-10-17 | Stop reason: HOSPADM

## 2024-10-17 RX ORDER — ONDANSETRON 2 MG/ML
INJECTION INTRAMUSCULAR; INTRAVENOUS AS NEEDED
Status: DISCONTINUED | OUTPATIENT
Start: 2024-10-17 | End: 2024-10-17

## 2024-10-17 RX ORDER — ACETAMINOPHEN 325 MG/1
650 TABLET ORAL EVERY 6 HOURS PRN
COMMUNITY
End: 2024-10-31 | Stop reason: ALTCHOICE

## 2024-10-17 RX ORDER — SODIUM CHLORIDE, SODIUM LACTATE, POTASSIUM CHLORIDE, CALCIUM CHLORIDE 600; 310; 30; 20 MG/100ML; MG/100ML; MG/100ML; MG/100ML
INJECTION, SOLUTION INTRAVENOUS CONTINUOUS PRN
Status: DISCONTINUED | OUTPATIENT
Start: 2024-10-17 | End: 2024-10-17

## 2024-10-17 RX ADMIN — FENTANYL CITRATE 50 MCG: 50 INJECTION, SOLUTION INTRAMUSCULAR; INTRAVENOUS at 13:45

## 2024-10-17 RX ADMIN — PROPOFOL 100 MCG/KG/MIN: 10 INJECTION, EMULSION INTRAVENOUS at 13:49

## 2024-10-17 RX ADMIN — FENTANYL CITRATE 50 MCG: 50 INJECTION, SOLUTION INTRAMUSCULAR; INTRAVENOUS at 13:40

## 2024-10-17 RX ADMIN — ONDANSETRON 4 MG: 2 INJECTION INTRAMUSCULAR; INTRAVENOUS at 13:51

## 2024-10-17 RX ADMIN — SODIUM CHLORIDE, SODIUM LACTATE, POTASSIUM CHLORIDE, AND CALCIUM CHLORIDE: .6; .31; .03; .02 INJECTION, SOLUTION INTRAVENOUS at 13:33

## 2024-10-17 RX ADMIN — MIDAZOLAM 2 MG: 1 INJECTION INTRAMUSCULAR; INTRAVENOUS at 13:40

## 2024-10-17 NOTE — ANESTHESIA POSTPROCEDURE EVALUATION
Post-Op Assessment Note    CV Status:  Stable    Pain management: adequate       Mental Status:  Alert and awake   Hydration Status:  Stable   PONV Controlled:  None   Airway Patency:  Patent     Post Op Vitals Reviewed: Yes    No anethesia notable event occurred.    Staff: Anesthesiologist, CRNA   Comments: vss        Last Filed PACU Vitals:  Vitals Value Taken Time   Temp 98.1 °F (36.7 °C) 10/17/24 1411   Pulse 90 10/17/24 1415   /63 10/17/24 1411   Resp 10 10/17/24 1415   SpO2 98 % 10/17/24 1415   Vitals shown include unfiled device data.    Modified Melita:  No data recorded

## 2024-10-17 NOTE — DISCHARGE INSTR - AVS FIRST PAGE
Gynecology Discharge Instructions  1. No heavy lifting more than about 10 lbs  2. Nothing in the vagina for 2 weeks  3. No tub baths or swimming.  4. Call the office for fever greater than 100.4, heavy vaginal bleeding or increasing pain.  5. Activity as tolerated

## 2024-10-17 NOTE — ANESTHESIA PREPROCEDURE EVALUATION
Procedure:  LOOP ELECTROCAUTERY EXCISION PROCEDURE (LEEP), EXAM UNDER ANESTHESIA (Cervix)  COLPOSCOPY (Vagina )    Relevant Problems   GI/HEPATIC   (+) Gastroesophageal reflux disease without esophagitis      NEURO/PSYCH   (+) Anxiety      Obstetrics/Gynecology   (+) ASCUS with positive high risk HPV cervical     GERD: well-controlled     Physical Exam    Airway    Mallampati score: I  TM Distance: >3 FB  Neck ROM: full     Dental   Comment: None loose    Invisalign to be removed, No notable dental hx     Cardiovascular      Pulmonary      Other Findings  post-pubertal.      Anesthesia Plan  ASA Score- 2     Anesthesia Type- IV sedation with anesthesia with ASA Monitors.         Additional Monitors:     Airway Plan:     Comment: Npo after Mn (sip of water with tylenol at 07:00)  Urine hcg = negative    Patient educated on the possibility for awareness under sedation and of the possibility of airway intervention in the event of an airway or procedural emergency  .       Plan Factors-Exercise tolerance (METS): >4 METS.    Chart reviewed.    Patient summary reviewed.    Patient is not a current smoker.              Induction- intravenous.    Postoperative Plan-         Informed Consent- Anesthetic plan and risks discussed with patient.  I personally reviewed this patient with the CRNA. Discussed and agreed on the Anesthesia Plan with the CRNA..

## 2024-10-17 NOTE — ANESTHESIA POSTPROCEDURE EVALUATION
Post-Op Assessment Note    CV Status:  Stable  Pain Score: 8    Pain management: satisfactory to patient       Mental Status:  Awake and alert   Hydration Status:  Stable   PONV Controlled:  None   Airway Patency:  Patent     Post Op Vitals Reviewed: Yes    No anethesia notable event occurred.    Staff: Anesthesiologist           Last Filed PACU Vitals:  Vitals Value Taken Time   Temp 98.1 °F (36.7 °C) 10/17/24 1411   Pulse 74 10/17/24 1448   /73 10/17/24 1448   Resp 16 10/17/24 1448   SpO2 96 % 10/17/24 1448       Modified Melita:  Activity: 2 (10/17/2024  2:48 PM)  Respiration: 2 (10/17/2024  2:48 PM)  Circulation: 2 (10/17/2024  2:48 PM)  Consciousness: 2 (10/17/2024  2:48 PM)  Oxygen Saturation: 2 (10/17/2024  2:48 PM)  Modified Melita Score: 10 (10/17/2024  2:48 PM)

## 2024-10-17 NOTE — H&P
H&P reviewed. After examining the patient I find no changes in the patients condition since the H&P had been written.    Vitals:    10/17/24 1239   BP: 145/81   Pulse: 87   Resp: 17   Temp: 97.5 °F (36.4 °C)   SpO2: 100%

## 2024-10-18 NOTE — OP NOTE
OPERATIVE REPORT  PATIENT NAME: Zuleima Mcdonough    :  1995  MRN: [id number]  Pt Location: [facility] OR ROOM 01    SURGERY DATE: 10/17/2024    Surgeons and Role:     * Sandra Rosenbaum MD - Primary    Preop Diagnosis:  Human papillomavirus (HPV) type 16 and 18 DNA detected in cervical specimen [R87.810]    Post-Op Diagnosis Codes:     * Human papillomavirus (HPV) type 16 and 18 DNA detected in cervical specimen [R87.810]    Procedure(s):  LOOP ELECTROCAUTERY EXCISION PROCEDURE (LEEP). EXAM UNDER ANESTHESIA  COLPOSCOPY    Specimen(s):  ID Type Source Tests Collected by Time Destination   1 : ECC Tissue Cervix, Endocervical TISSUE EXAM Sandra Rosenbaum MD 10/17/2024  1:55 PM    2 : Cervical LEEP Tissue Vaginal/Cervical TISSUE EXAM Sandra Rosenbaum MD 10/17/2024  1:56 PM        Estimated Blood Loss:   Minimal      Anesthesia Type:   IV Sedation with Anesthesia    Operative Indications:  Human papillomavirus (HPV) type 16 and 18 DNA detected in cervical specimen [R87.810]      Operative Findings:    Normal nulliparous cervix.  Entire TZ seen on colposcopic exam.  No abnormalities noted.      Complications:   None    Procedure and Technique:    After the patient was identified IV anesthesia was administered.  She was prepped and draped in the dorsal lithotomy position.  A coated speculum was placed in the vagina.  The cervix and vagina were inspected with the colposcope after application of acetic acid.  No abnormalities were identified.  A 20 x 12 mm Megadyne loop electrode was used to perform the excision through the entire TZ.  An endocervical curettage was then performed using a Kevorkian curette.  The LEEP bed was the cauterized with the ball electrode.  Excellent hemostasis was maintained throughout the procedure.  The speculum was removed from the vagina.  All instrument and sponge counts were correct at the end of the procedure.    Patient Disposition:  PACU  and hemodynamically stable       I  was present for the entirety of the case.  No qualified resident was available to assist.      SIGNATURE: Sandra Rosenbaum MD  DATE: October 18, 2024  TIME: 7:54 AM

## 2024-10-29 ENCOUNTER — NURSE TRIAGE (OUTPATIENT)
Age: 29
End: 2024-10-29

## 2024-10-29 NOTE — TELEPHONE ENCOUNTER
"Post op patient had LEEP procedure 10/17. States menses began 10/20-10/26. Pt states menses would have typically ended by now, but bleeding is continuing. Patient states bleeding is currently mild and wearing panty liner, but it does not seem to be letting up. Patient also reports moderate cramping that has been ongoing since before LEEP procedure. Cramping is still persistent today. Denies fevers/chills, but states she feels very hot all the time within the past year.  Patient has upcoming post op appointment 10/31/24.    Advised to continue monitoring symptoms and call back if bleeding/pain becomes severe, or with any fevers/chills. Advised to keep upcoming appointment as scheduled. Routing to provider for additional recommendation.    Answer Assessment - Initial Assessment Questions  1. SYMPTOM: \"What's the main symptom you're concerned about?\" (e.g., pain, fever, vomiting)      Vaginal bleeding, abdominal cramping  2. ONSET: \"When did s/s  start?\"      10/20 vaginal bleeding began, abdominal cramping on both sides of ovaries since before LEEP  3. SURGERY: \"What surgery did you have?\"      LEEP   4. DATE of SURGERY: \"When was the surgery?\"       10/17  5. ANESTHESIA: \"What type of anesthesia did you have?\" (e.g., general, spinal, epidural, local)      IV Sedation with Anesthesia  7. PAIN: \"Is there any pain?\" If Yes, ask: \"How bad is it?\"  (Scale 1-10; or mild, moderate, severe)      Moderate cramping  8. FEVER: \"Do you have a fever?\" If Yes, ask: \"What is your temperature, how was it measured, and when did it start?\"      Denies - but feels hot. Feeling hot all the time.   9. VOMITING: \"Is there any vomiting?\" If Yes, ask: \"How many times?\"      Denies  10. BLEEDING: \"Is there any bleeding?\" If Yes, ask: \"How much?\" and \"Where?\"        Mild - continuous  11. OTHER SYMPTOMS: \"Do you have any other symptoms?\" (e.g., drainage from wound, painful urination, constipation)        Denies    Protocols used: Post-Op " Symptoms and Questions-Adult-[place]

## 2024-10-31 ENCOUNTER — OFFICE VISIT (OUTPATIENT)
Age: 29
End: 2024-10-31

## 2024-10-31 DIAGNOSIS — Z98.890 S/P LEEP: ICD-10-CM

## 2024-10-31 DIAGNOSIS — D06.9 SEVERE CERVICAL DYSPLASIA: ICD-10-CM

## 2024-10-31 DIAGNOSIS — Z48.89 POSTOPERATIVE VISIT: Primary | ICD-10-CM

## 2024-10-31 PROCEDURE — 99024 POSTOP FOLLOW-UP VISIT: CPT | Performed by: OBSTETRICS & GYNECOLOGY

## 2024-10-31 NOTE — PROGRESS NOTES
Assessment:    S/p colpo/LEEP for +Hr=HPV 16,18,45  DI III on LEEP (clear margins/neg ECC)  Doing well postoperatively.  Operative findings again reviewed. Pathology report discussed.     Plan:    1. Continue any current medications.  2. Wound care discussed.  3. Activity restrictions: none  4. Anticipated return to work: not applicable.  5. Follow up: 6  months  for repap.       Sanket Mcdonough is a 29 y.o. female who presents to the clinic 2 weeks status post  colpo/LEEP  for  +HR-HPV 16, 18 and 45. . Eating a regular diet without difficulty. Bowel movements are normal. The patient is not having any pain.    The following portions of the patient's history were reviewed and updated as appropriate: allergies, current medications, past family history, past medical history, past social history, past surgical history, and problem list.    Review of Systems  Pertinent items are noted in HPI.      Objective     LMP 09/22/2024 (Exact Date)   General:  alert and oriented, in no acute distress   Abdomen: Non-distended   Pelvic:   declined

## 2024-12-06 ENCOUNTER — TELEPHONE (OUTPATIENT)
Age: 29
End: 2024-12-06

## 2024-12-06 DIAGNOSIS — B37.31 YEAST INFECTION INVOLVING THE VAGINA AND SURROUNDING AREA: Primary | ICD-10-CM

## 2024-12-06 RX ORDER — FLUCONAZOLE 150 MG/1
150 TABLET ORAL DAILY
Qty: 1 TABLET | Refills: 0 | Status: SHIPPED | OUTPATIENT
Start: 2024-12-06 | End: 2024-12-07

## 2024-12-06 NOTE — TELEPHONE ENCOUNTER
"Pt writing in with concerns for yeast infection the last 48 hours. Per patient, experiencing thick, white vaginal discharge with a slight odor, and a vaginal burning sensation. Per patient, she is prone to yeast infections and these are the typical symptoms she experiences. Normally gets 1 dose of fluconazole which usually clears symptoms fully. RN sent fluconazole per protocol to pharmacy on file. Sent [employer] message reply providing care advice, and informing patient to call office to schedule appointment if symptoms do not improve with treatment.     \"How many yeast infections have you had in the past 2 years?\"    -If 1 or less, prescribe Diflucan 150mg PO. If no improvement after 1 dose treatment, please instruct patient to call back to schedule appointment. (should be seen within 3 days)    -If more than 4 or more yeast infections in a year or if they are recurrent, schedule appointment within 3 days.    For OB patients: if patient wishes to use over the counter monistat, recommend only the 7 day treatment.    "

## 2025-01-21 ENCOUNTER — TELEPHONE (OUTPATIENT)
Age: 30
End: 2025-01-21

## 2025-01-21 ENCOUNTER — OFFICE VISIT (OUTPATIENT)
Dept: FAMILY MEDICINE CLINIC | Facility: CLINIC | Age: 30
End: 2025-01-21
Payer: COMMERCIAL

## 2025-01-21 VITALS
WEIGHT: 126.6 LBS | HEART RATE: 84 BPM | BODY MASS INDEX: 22.43 KG/M2 | SYSTOLIC BLOOD PRESSURE: 120 MMHG | DIASTOLIC BLOOD PRESSURE: 82 MMHG | HEIGHT: 63 IN | OXYGEN SATURATION: 100 %

## 2025-01-21 DIAGNOSIS — M54.2 NECK PAIN: Primary | ICD-10-CM

## 2025-01-21 DIAGNOSIS — M79.601 RIGHT ARM PAIN: ICD-10-CM

## 2025-01-21 PROCEDURE — 99213 OFFICE O/P EST LOW 20 MIN: CPT | Performed by: FAMILY MEDICINE

## 2025-01-21 RX ORDER — CLINDAMYCIN PHOSPHATE 10 UG/ML
LOTION TOPICAL
COMMUNITY
Start: 2024-12-12

## 2025-01-21 RX ORDER — DOXYCYCLINE 100 MG/1
100 CAPSULE ORAL DAILY
COMMUNITY
Start: 2025-01-18

## 2025-01-21 RX ORDER — CYCLOBENZAPRINE HCL 5 MG
5 TABLET ORAL 3 TIMES DAILY PRN
Qty: 30 TABLET | Refills: 0 | Status: SHIPPED | OUTPATIENT
Start: 2025-01-21

## 2025-01-21 RX ORDER — METHYLPREDNISOLONE 4 MG/1
TABLET ORAL
Qty: 21 EACH | Refills: 0 | Status: SHIPPED | OUTPATIENT
Start: 2025-01-21

## 2025-01-21 NOTE — PROGRESS NOTES
"Name: Zuleima Mcdonough      : 1995      MRN: [id number]  Encounter Provider: Brenda Pelletier MD  Encounter Date: 2025   Encounter department: [facility] [facility]  :  Assessment & Plan  Neck pain  - She may continue with Tylenol as well as ice/heat application.  Will prescribe short course of Flexeril 5 mg 3 times daily as needed as well as Medrol Dosepak. Follow up if no improvement.  Orders:    cyclobenzaprine (FLEXERIL) 5 mg tablet; Take 1 tablet (5 mg total) by mouth 3 (three) times a day as needed for muscle spasms    methylPREDNISolone 4 MG tablet therapy pack; Use as directed on package    Right arm pain  Management as above             HPI    Zuleima Mcdonough is a very pleasant 29 year old female who presents today with a chief complaint of right sided neck, arm and elbow pain after fall.  Patient states that she was walking her dog in the snow 2 days ago when her dog got excited and pulled her causing her to fall in her right shoulder.  She states that she fell in the snow so she had a soft landing and took Tylenol for the pain.  However a few hours later when she went to clean her car she slipped on ice and fell onto her right side again.  Since that time she has been having a lot of right-sided neck, arm and elbow pain.  She has been taking Tylenol relief.  She does have an allergy to NSAIDs.     Review of Systems   Constitutional: Negative.    HENT: Negative.     Eyes: Negative.    Respiratory: Negative.     Cardiovascular: Negative.    Gastrointestinal: Negative.    Genitourinary: Negative.    Musculoskeletal:  Positive for arthralgias and neck pain.   Skin: Negative.    Neurological: Negative.    Psychiatric/Behavioral: Negative.         Objective   /82 (BP Location: Left arm, Patient Position: Sitting, Cuff Size: Adult)   Pulse 84   Ht 5' 2.5\" (1.588 m)   Wt 57.4 kg (126 lb 9.6 oz)   SpO2 100%   BMI 22.79 kg/m²      Physical Exam  Constitutional:       General: She is not in " acute distress.     Appearance: She is not ill-appearing.   HENT:      Head: Normocephalic and atraumatic.   Eyes:      General:         Right eye: No discharge.         Left eye: No discharge.      Extraocular Movements: Extraocular movements intact.   Cardiovascular:      Rate and Rhythm: Normal rate.   Pulmonary:      Effort: Pulmonary effort is normal. No respiratory distress.      Breath sounds: No wheezing.   Musculoskeletal:      Right elbow: No swelling. Normal range of motion. Tenderness present.      Cervical back: Pain with movement and muscular tenderness present. Decreased range of motion.   Neurological:      General: No focal deficit present.      Mental Status: She is alert.   Psychiatric:         Mood and Affect: Mood normal.         Behavior: Behavior normal.

## 2025-01-21 NOTE — TELEPHONE ENCOUNTER
Pt called back to check the status of this request, she is in so much pain so I scheduled her for the 10 am appt. Pt stated she has to confirm if she can come in for it if not she will call back to make it a virtual visit.

## 2025-01-21 NOTE — TELEPHONE ENCOUNTER
Patient fell in the snow yesterday and landed on her back/side. Her neck and back are in a lot of pain and she's having difficulty moving around and driving. Asking for a muscle relaxer to be sent in for her until she comes in for her early exam. Patient asking for a call back if anything is sent.

## 2025-01-21 NOTE — LETTER
January 21, 2025     Patient: Zuleima Mcdonough  YOB: 1995  Date of Visit: 1/21/2025      To Whom it May Concern:    Zuleima Mcdonough is under my professional care. Zuleima was seen in my office on 1/21/2025. Please allow Zuleima to work from home this week. She may return to work in the office 1/27/2025.     If you have any questions or concerns, please don't hesitate to call.         Sincerely,          Brenda Pelletier MD

## 2025-02-14 DIAGNOSIS — Z30.41 ENCOUNTER FOR SURVEILLANCE OF CONTRACEPTIVE PILLS: ICD-10-CM

## 2025-02-14 RX ORDER — NORGESTIMATE AND ETHINYL ESTRADIOL 7DAYSX3 28
1 KIT ORAL DAILY
Qty: 28 TABLET | Refills: 5 | Status: SHIPPED | OUTPATIENT
Start: 2025-02-14

## 2025-02-14 NOTE — TELEPHONE ENCOUNTER
Reason for call:   [x] Refill   [] Prior Auth  [x] Other: Patient stated she thought she had enough refills to get her to September when her yearly is due. The chart shows a year supply was written 09- but the pharmacy is telling the patient she does not have enough refills. The patient is asking for a new script to be sent to get her the remaining Qty until September 2025. She Also stated her gets this monthly if the script can be written that way instead of 90 day supply.    Office:   [] PCP/Provider -   [x] Specialty/Provider -     Ordering Department: [facility] [place]  Authorized By: Sandra Rosenbaum MD    Medication:  Tri-Sprintec 0.18/0.215/0.25 MG-35 MCG per tablet    Dose/Frequency:  Take 1 tablet by mouth daily     Quantity: 84    Pharmacy: [id number] [place] [id number] [facility] [phone]    Does the patient have enough for 3 days?   [x] Yes   [] No - Send as HP to POD

## 2025-03-11 ENCOUNTER — NURSE TRIAGE (OUTPATIENT)
Age: 30
End: 2025-03-11

## 2025-03-11 NOTE — TELEPHONE ENCOUNTER
"FOLLOW UP: Routing to provider for advice.    REASON FOR CONVERSATION: Contraception and Vaginal Bleeding    SYMPTOMS: Heavy vaginal bleeding since 3/5, began placebo week on pill pack and patient expecting to continue with heavy menses until around 3/17. 6-7/10 abdominal cramping, only slightly improves with 1,000 mg Tylenol around the clock.     OTHER: Patient reports heavy vaginal bleeding since 3/5 with associated 6-7/10 abdominal cramping. Takes Tri-Sprintec birth control, takes at the same time every day. Patient concerned for length of heavy period. Denies severe bleeding. Patient asking if provider can advise on medication management to help with cramping. States 1,000 mg Tylenol only slightly helps. States she cannot take ibuprofen. Offered patient appt within 2 weeks for eval. Patient states she already has 6 month repap scheduled on 4/14 - patient asking if provider can bump up appointment to end of this month so she can address concern and see her for 6 month repap within same appt.   Patient aware to go to ED with severe vaginal bleeding/pain.    DISPOSITION: See Within 2 Weeks in Office  Reason for Disposition   Periods last > 7 days    Answer Assessment - Initial Assessment Questions  1. BLEEDING SEVERITY: \"Describe the bleeding that you are having.\" \"How much bleeding is there?\"       Heavy vaginal bleeding - not severe.  2. ONSET: \"When did the bleeding begin?\" \"Is it continuing now?\"      3/5  3. MENSTRUAL PERIOD: \"When was the last normal menstrual period?\" \"How is this different than your period?\"      2/9 - 2/15  4. REGULARITY: \"How regular are your periods?\"      Longer periods than average.   5. ABDOMEN PAIN: \"Do you have any pain?\" \"How bad is the pain?\"  (e.g., Scale 0-10; none, mild, moderate, or severe)      6-7/10 - can only take Tylenol and seems to slightly improve.   8. HORMONE MEDICINES: \"Are you taking any hormone medicines, prescription or over-the-counter?\" (e.g., birth control " "pills, estrogen)      Tri-Sprintec 0.18/0.215/0.25 MG-35 MCG per tablet. Takes at same time every night.  9. BLOOD THINNER MEDICINES: \"Do you take any blood thinners?\" (e.g., Coumadin / warfarin, Pradaxa / dabigatran, aspirin)      Denies  10. CAUSE: \"What do you think is causing the bleeding?\" (e.g., recent gyn surgery, recent gyn procedure; known bleeding disorder, cervical cancer, polycystic ovarian disease, fibroids)          Patient unsure  11. HEMODYNAMIC STATUS: \"Are you weak or feeling lightheaded?\" If Yes, ask: \"Can you stand and walk normally?\"         Denies lightheadedness, weakness. Mild dizziness.  12. OTHER SYMPTOMS: \"What other symptoms are you having with the bleeding?\" (e.g., passed tissue, vaginal discharge, fever, menstrual-type cramps)        Cold sweats, hard to focus due to pain.    Protocols used: Vaginal Bleeding - Abnormal-Adult-[place]    "

## 2025-03-12 NOTE — TELEPHONE ENCOUNTER
Patient returning call. Reviewed message per Dr. James. Patient verbalizes understanding and will follow up at appointment next month. She is currently on her period and bleeding is normal for her. Review bleeding precautions and when to seek emergency care.

## 2025-03-25 ENCOUNTER — TELEPHONE (OUTPATIENT)
Dept: FAMILY MEDICINE CLINIC | Facility: CLINIC | Age: 30
End: 2025-03-25

## 2025-03-25 NOTE — TELEPHONE ENCOUNTER
Patient calling as she would like for lexapro to be prescribed again to [facility] pharmacy in [facility]. Requesting HP

## 2025-03-26 DIAGNOSIS — F41.9 ANXIETY: Primary | ICD-10-CM

## 2025-03-26 RX ORDER — ESCITALOPRAM OXALATE 5 MG/1
5 TABLET ORAL DAILY
Qty: 30 TABLET | Refills: 1 | Status: SHIPPED | OUTPATIENT
Start: 2025-03-26

## 2025-04-15 ENCOUNTER — OFFICE VISIT (OUTPATIENT)
Age: 30
End: 2025-04-15
Payer: COMMERCIAL

## 2025-04-15 VITALS — WEIGHT: 123.6 LBS | DIASTOLIC BLOOD PRESSURE: 62 MMHG | SYSTOLIC BLOOD PRESSURE: 122 MMHG | BODY MASS INDEX: 22.25 KG/M2

## 2025-04-15 DIAGNOSIS — Z30.41 ENCOUNTER FOR SURVEILLANCE OF CONTRACEPTIVE PILLS: ICD-10-CM

## 2025-04-15 DIAGNOSIS — Z98.890 S/P LEEP: Primary | ICD-10-CM

## 2025-04-15 DIAGNOSIS — Z01.42 PAP SMEAR TO CONFIRM NORMAL AFTER ABNORMAL RESULT: ICD-10-CM

## 2025-04-15 PROCEDURE — G0145 SCR C/V CYTO,THINLAYER,RESCR: HCPCS | Performed by: OBSTETRICS & GYNECOLOGY

## 2025-04-15 PROCEDURE — 99213 OFFICE O/P EST LOW 20 MIN: CPT | Performed by: OBSTETRICS & GYNECOLOGY

## 2025-04-15 PROCEDURE — G0476 HPV COMBO ASSAY CA SCREEN: HCPCS | Performed by: OBSTETRICS & GYNECOLOGY

## 2025-04-15 RX ORDER — NORGESTIMATE AND ETHINYL ESTRADIOL 7DAYSX3 28
1 KIT ORAL DAILY
Qty: 112 TABLET | Refills: 4 | Status: SHIPPED | OUTPATIENT
Start: 2025-04-15 | End: 2025-07-14

## 2025-04-15 NOTE — PROGRESS NOTES
GYN Follow Up Visit    Zuleima Mcdonough is a 30 y.o. woman who comes in today for a pap smear only. Her most recent annual exam was in 9/2024. This is first Pap smear s/p LEEP showing DI 2-3.  Notes cycles are heavier but has no other complaints.    Contraception: OCP (estrogen/progesterone)    The following portions of the patient's history were reviewed and updated as appropriate: allergies, current medications, past family history, past medical history, past social history, past surgical history, and problem list.    Review of Systems  Pertinent items are noted in HPI..    Objective     /62 (BP Location: Right arm, Patient Position: Sitting, Cuff Size: Standard)   Wt 56.1 kg (123 lb 9.6 oz)   LMP 04/04/2025 (Exact Date)   BMI 22.25 kg/m²     Physical Exam  Constitutional:       Appearance: Normal appearance.   Genitourinary:      Vulva and urethral meatus normal.      Genitourinary Comments: 2-3 mm skin tag on mons      No vaginal discharge, bleeding or ulceration.      No cervical discharge, friability or lesion.   HENT:      Head: Normocephalic.   Cardiovascular:      Rate and Rhythm: Normal rate and regular rhythm.   Pulmonary:      Effort: Pulmonary effort is normal.   Abdominal:      Tenderness: There is no abdominal tenderness.   Musculoskeletal:         General: No swelling.   Neurological:      General: No focal deficit present.      Mental Status: She is alert and oriented to person, place, and time.   Skin:     General: Skin is warm and dry.   Psychiatric:         Mood and Affect: Mood normal.         Behavior: Behavior normal.   Vitals reviewed.          Assessment & Plan     Screening pap smear.    Follow up in 6 months, or as indicated by Pap results.

## 2025-04-16 LAB
HPV HR 12 DNA CVX QL NAA+PROBE: NEGATIVE
HPV16 DNA CVX QL NAA+PROBE: NEGATIVE
HPV18 DNA CVX QL NAA+PROBE: NEGATIVE

## 2025-04-17 LAB
LAB AP GYN PRIMARY INTERPRETATION: NORMAL
LAB AP LMP: NORMAL
Lab: NORMAL

## 2025-04-18 ENCOUNTER — RESULTS FOLLOW-UP (OUTPATIENT)
Age: 30
End: 2025-04-18

## 2025-04-23 ENCOUNTER — TELEPHONE (OUTPATIENT)
Age: 30
End: 2025-04-23

## 2025-04-23 NOTE — TELEPHONE ENCOUNTER
Pt states in her [facility]  the   Tri-Sprintec 0.18/0.215/0.25 MG-35 MCG per tablet 1 tablet, Oral, Daily   Is coming up as something else, she would like to be called and advised on what this is. She also sent a my chart message, please advise

## 2025-04-23 NOTE — TELEPHONE ENCOUNTER
Spoke with the patient advised that some medications that are generic have different names but same formula. Patient also wanting to know why she is only getting one month supply at a time. I did call the pharmacy and spoke with Leesa who try to run it through for the 112 tabs but patient recently filled her script so it was kicked back. She is going to call back when she is on placebo week to have them try to fill for 112 tabs and if it is not covered we can try a prior auth if required.

## 2025-08-14 ENCOUNTER — NURSE TRIAGE (OUTPATIENT)
Age: 30
End: 2025-08-14

## (undated) DEVICE — LLETZ LOOP 20 X 12MM MEGADYNE

## (undated) DEVICE — STERILE 8 INCH PROCTO SWAB: Brand: CARDINAL HEALTH

## (undated) DEVICE — PENCIL ELECTROSURG E-Z CLEAN -0035H

## (undated) DEVICE — ELECTRODE BALL E-Z CLEAN 2IN -0015

## (undated) DEVICE — GLOVE PI ULTRA TOUCH SZ.7.5

## (undated) DEVICE — PAD SANITARY

## (undated) DEVICE — INTENDED FOR TISSUE SEPARATION, AND OTHER PROCEDURES THAT REQUIRE A SHARP SURGICAL BLADE TO PUNCTURE OR CUT.: Brand: BARD-PARKER SAFETY BLADES SIZE 11, STERILE

## (undated) DEVICE — STRL ALLENTOWN HYSTEROSCOPY PK: Brand: CARDINAL HEALTH

## (undated) DEVICE — SPONGE 4 X 4 XRAY 16 PLY STRL LF RFD